# Patient Record
Sex: MALE | Race: WHITE | NOT HISPANIC OR LATINO | Employment: OTHER | URBAN - NONMETROPOLITAN AREA
[De-identification: names, ages, dates, MRNs, and addresses within clinical notes are randomized per-mention and may not be internally consistent; named-entity substitution may affect disease eponyms.]

---

## 2017-01-01 ENCOUNTER — OFFICE VISIT (OUTPATIENT)
Dept: CARDIOLOGY | Facility: CLINIC | Age: 57
End: 2017-01-01

## 2017-01-01 ENCOUNTER — TELEPHONE (OUTPATIENT)
Dept: CARDIOLOGY | Facility: CLINIC | Age: 57
End: 2017-01-01

## 2017-01-01 VITALS
OXYGEN SATURATION: 99 % | SYSTOLIC BLOOD PRESSURE: 127 MMHG | DIASTOLIC BLOOD PRESSURE: 98 MMHG | BODY MASS INDEX: 45.1 KG/M2 | HEIGHT: 70 IN | HEART RATE: 88 BPM | WEIGHT: 315 LBS

## 2017-01-01 VITALS
BODY MASS INDEX: 45.1 KG/M2 | WEIGHT: 315 LBS | OXYGEN SATURATION: 97 % | DIASTOLIC BLOOD PRESSURE: 75 MMHG | HEIGHT: 70 IN | HEART RATE: 71 BPM | SYSTOLIC BLOOD PRESSURE: 111 MMHG

## 2017-01-01 VITALS
HEIGHT: 70 IN | OXYGEN SATURATION: 97 % | HEART RATE: 70 BPM | SYSTOLIC BLOOD PRESSURE: 132 MMHG | BODY MASS INDEX: 44.78 KG/M2 | WEIGHT: 312.8 LBS | DIASTOLIC BLOOD PRESSURE: 80 MMHG

## 2017-01-01 DIAGNOSIS — I10 ESSENTIAL HYPERTENSION: Primary | ICD-10-CM

## 2017-01-01 DIAGNOSIS — I50.20 SYSTOLIC CONGESTIVE HEART FAILURE, UNSPECIFIED CONGESTIVE HEART FAILURE CHRONICITY: ICD-10-CM

## 2017-01-01 DIAGNOSIS — I25.10 CORONARY ARTERY DISEASE INVOLVING NATIVE CORONARY ARTERY OF NATIVE HEART WITHOUT ANGINA PECTORIS: ICD-10-CM

## 2017-01-01 DIAGNOSIS — I42.9 CARDIOMYOPATHY (HCC): ICD-10-CM

## 2017-01-01 DIAGNOSIS — R06.02 SHORTNESS OF BREATH: ICD-10-CM

## 2017-01-01 DIAGNOSIS — R07.9 CHEST PAIN, UNSPECIFIED TYPE: ICD-10-CM

## 2017-01-01 DIAGNOSIS — R07.2 PRECORDIAL PAIN: ICD-10-CM

## 2017-01-01 DIAGNOSIS — I25.10 CORONARY ARTERY DISEASE INVOLVING NATIVE CORONARY ARTERY OF NATIVE HEART WITHOUT ANGINA PECTORIS: Primary | ICD-10-CM

## 2017-01-01 DIAGNOSIS — I48.20 CHRONIC ATRIAL FIBRILLATION (HCC): ICD-10-CM

## 2017-01-01 DIAGNOSIS — I42.9 CARDIOMYOPATHY (HCC): Primary | ICD-10-CM

## 2017-01-01 DIAGNOSIS — E78.5 HYPERLIPEMIA: ICD-10-CM

## 2017-01-01 DIAGNOSIS — R68.89 ABNORMAL ANKLE BRACHIAL INDEX: ICD-10-CM

## 2017-01-01 DIAGNOSIS — L97.921 ULCER OF LEG, CHRONIC, LEFT, LIMITED TO BREAKDOWN OF SKIN (HCC): Primary | ICD-10-CM

## 2017-01-01 DIAGNOSIS — I73.9 PAD (PERIPHERAL ARTERY DISEASE) (HCC): ICD-10-CM

## 2017-01-01 PROCEDURE — 99214 OFFICE O/P EST MOD 30 MIN: CPT | Performed by: PHYSICIAN ASSISTANT

## 2017-01-01 PROCEDURE — 99213 OFFICE O/P EST LOW 20 MIN: CPT | Performed by: PHYSICIAN ASSISTANT

## 2017-01-01 RX ORDER — ATORVASTATIN CALCIUM 80 MG/1
TABLET, FILM COATED ORAL
Qty: 30 TABLET | Refills: 2 | Status: SHIPPED | OUTPATIENT
Start: 2017-01-01 | End: 2017-01-01 | Stop reason: SDUPTHER

## 2017-01-01 RX ORDER — TIOTROPIUM BROMIDE INHALATION SPRAY 3.12 UG/1
SPRAY, METERED RESPIRATORY (INHALATION)
COMMUNITY
Start: 2017-01-01 | End: 2018-01-01 | Stop reason: ALTCHOICE

## 2017-01-01 RX ORDER — AMIODARONE HYDROCHLORIDE 200 MG/1
TABLET ORAL DAILY
COMMUNITY
Start: 2017-01-01 | End: 2017-01-01 | Stop reason: SDUPTHER

## 2017-01-01 RX ORDER — BUMETANIDE 1 MG/1
2 TABLET ORAL 2 TIMES DAILY
COMMUNITY
Start: 2017-01-01 | End: 2017-01-01 | Stop reason: SDUPTHER

## 2017-01-01 RX ORDER — ISOSORBIDE MONONITRATE 30 MG/1
TABLET, EXTENDED RELEASE ORAL
Qty: 15 TABLET | Refills: 4 | Status: SHIPPED | OUTPATIENT
Start: 2017-01-01 | End: 2018-01-01 | Stop reason: SDUPTHER

## 2017-01-01 RX ORDER — ATORVASTATIN CALCIUM 80 MG/1
TABLET, FILM COATED ORAL
Qty: 30 TABLET | Refills: 1 | Status: SHIPPED | OUTPATIENT
Start: 2017-01-01 | End: 2018-01-01 | Stop reason: SDUPTHER

## 2017-01-01 RX ORDER — BUMETANIDE 1 MG/1
TABLET ORAL
Qty: 120 TABLET | Refills: 5 | Status: SHIPPED | OUTPATIENT
Start: 2017-01-01 | End: 2018-01-01 | Stop reason: SDUPTHER

## 2017-01-01 RX ORDER — CLONIDINE HYDROCHLORIDE 0.1 MG/1
TABLET ORAL
Qty: 90 TABLET | Refills: 3 | Status: SHIPPED | OUTPATIENT
Start: 2017-01-01

## 2017-01-01 RX ORDER — CARVEDILOL 12.5 MG/1
12.5 TABLET ORAL 2 TIMES DAILY
Qty: 60 TABLET | Refills: 11 | Status: SHIPPED | OUTPATIENT
Start: 2017-01-01 | End: 2018-01-01 | Stop reason: DRUGHIGH

## 2017-01-01 RX ORDER — AMIODARONE HYDROCHLORIDE 400 MG/1
TABLET ORAL
Qty: 30 TABLET | Refills: 2 | Status: SHIPPED | OUTPATIENT
Start: 2017-01-01 | End: 2018-01-01 | Stop reason: SDUPTHER

## 2017-01-01 RX ORDER — BUMETANIDE 1 MG/1
2 TABLET ORAL 2 TIMES DAILY
Qty: 60 TABLET | Refills: 6 | Status: SHIPPED | OUTPATIENT
Start: 2017-01-01 | End: 2017-01-01 | Stop reason: SDUPTHER

## 2017-01-01 RX ORDER — SPIRONOLACTONE 25 MG/1
25 TABLET ORAL DAILY
Qty: 30 TABLET | Refills: 11 | Status: SHIPPED | OUTPATIENT
Start: 2017-01-01 | End: 2017-01-01

## 2017-01-01 RX ORDER — SACUBITRIL AND VALSARTAN 49; 51 MG/1; MG/1
TABLET, FILM COATED ORAL
Qty: 60 TABLET | Refills: 4 | Status: SHIPPED | OUTPATIENT
Start: 2017-01-01 | End: 2018-01-01 | Stop reason: SDUPTHER

## 2017-01-04 ENCOUNTER — OFFICE VISIT (OUTPATIENT)
Dept: CARDIOLOGY | Facility: CLINIC | Age: 57
End: 2017-01-04

## 2017-01-04 VITALS
HEIGHT: 70 IN | BODY MASS INDEX: 45.1 KG/M2 | WEIGHT: 315 LBS | DIASTOLIC BLOOD PRESSURE: 87 MMHG | OXYGEN SATURATION: 98 % | HEART RATE: 69 BPM | SYSTOLIC BLOOD PRESSURE: 128 MMHG

## 2017-01-04 DIAGNOSIS — I25.10 CORONARY ARTERIOSCLEROSIS IN NATIVE ARTERY: ICD-10-CM

## 2017-01-04 DIAGNOSIS — I65.23 BILATERAL CAROTID ARTERY STENOSIS: ICD-10-CM

## 2017-01-04 DIAGNOSIS — I25.5 GENERALIZED ISCHEMIC MYOCARDIAL DYSFUNCTION: ICD-10-CM

## 2017-01-04 DIAGNOSIS — I48.20 CHRONIC ATRIAL FIBRILLATION (HCC): Primary | ICD-10-CM

## 2017-01-04 DIAGNOSIS — I10 ESSENTIAL HYPERTENSION: ICD-10-CM

## 2017-01-04 DIAGNOSIS — R60.9 PERIPHERAL EDEMA: ICD-10-CM

## 2017-01-04 DIAGNOSIS — E78.5 DYSLIPIDEMIA: ICD-10-CM

## 2017-01-04 PROCEDURE — 99213 OFFICE O/P EST LOW 20 MIN: CPT | Performed by: NURSE PRACTITIONER

## 2017-01-04 RX ORDER — AMIODARONE HYDROCHLORIDE 400 MG/1
400 TABLET ORAL DAILY
Qty: 90 TABLET | Refills: 3 | Status: SHIPPED | OUTPATIENT
Start: 2017-01-04 | End: 2017-01-01 | Stop reason: SDUPTHER

## 2017-01-04 NOTE — PATIENT INSTRUCTIONS
"You Can Quit Smoking  If you are ready to quit smoking or are thinking about it, congratulations! You have chosen to help yourself be healthier and live longer! There are lots of different ways to quit smoking. Nicotine gum, nicotine patches, a nicotine inhaler, or nicotine nasal spray can help with physical craving. Hypnosis, support groups, and medicines help break the habit of smoking.  TIPS TO GET OFF AND STAY OFF CIGARETTES  · Learn to predict your moods. Do not let a bad situation be your excuse to have a cigarette. Some situations in your life might tempt you to have a cigarette.  · Ask friends and co-workers not to smoke around you.  · Make your home smoke-free.  · Never have \"just one\" cigarette. It leads to wanting another and another. Remind yourself of your decision to quit.  · On a card, make a list of your reasons for not smoking. Read it at least the same number of times a day as you have a cigarette. Tell yourself everyday, \"I do not want to smoke. I choose not to smoke.\"  · Ask someone at home or work to help you with your plan to quit smoking.  · Have something planned after you eat or have a cup of coffee. Take a walk or get other exercise to perk you up. This will help to keep you from overeating.  · Try a relaxation exercise to calm you down and decrease your stress. Remember, you may be tense and nervous the first two weeks after you quit. This will pass.  · Find new activities to keep your hands busy. Play with a pen, coin, or rubber band. Doodle or draw things on paper.  · Brush your teeth right after eating. This will help cut down the craving for the taste of tobacco after meals. You can try mouthwash too.  · Try gum, breath mints, or diet candy to keep something in your mouth.  IF YOU SMOKE AND WANT TO QUIT:  · Do not stock up on cigarettes. Never buy a carton. Wait until one pack is finished before you buy another.  · Never carry cigarettes with you at work or at home.  · Keep cigarettes " "as far away from you as possible. Leave them with someone else.  · Never carry matches or a lighter with you.  · Ask yourself, \"Do I need this cigarette or is this just a reflex?\"  · Bet with someone that you can quit. Put cigarette money in a [employer] bank every morning. If you smoke, you give up the money. If you do not smoke, by the end of the week, you keep the money.  · Keep trying. It takes 21 days to change a habit!  · Talk to your doctor about using medicines to help you quit. These include nicotine replacement gum, lozenges, or skin patches.     This information is not intended to replace advice given to you by your health care provider. Make sure you discuss any questions you have with your health care provider.     Document Released: 10/14/2010 Document Revised: 03/11/2013 Document Reviewed: 10/14/2010  [employer] Interactive Patient Education ©2016 [employer]  Cardiomyopathy  Cardiomyopathy is a long-term (chronic) disease of the heart muscle (myocardium). Over time, the heart becomes abnormally large, thick, or stiff. This makes it harder for the heart to pump blood and can lead to heart failure.  There are several types of cardiomyopathy:  · Dilated cardiomyopathy. This type causes the ventricles become large and weak.  · Hypertrophic cardiomyopathy. This type causes the heart muscle to thicken.  · Restrictive cardiomyopathy. This type causes the heart muscle to become rigid and less elastic.  · Ischemic cardiomyopathy. This type involves narrowing arteries that cause the walls of the heart get thinner.  · Peripartum cardiomyopathy. This type occurs during pregnancy or shortly after pregnancy.  CAUSES  The cause of cardiomyopathy is often not known. In some cases, it is passed down (inherited) from a family member who also had cardiomyopathy. The disease may develop as a complication of another medical condition. These conditions can include:  · Diabetes.  · High blood pressure.  · Viral infection of the " heart.  · Heart attack.  · Coronary heart disease.  RISK FACTORS  You may be more likely to develop cardiomyopathy if you:  · Have a family history of cardiomyopathy or other heart problems.  · Are overweight or obese.  · Use illegal drugs.  · Abuse alcohol.  · Have diabetes.  · Have another disease that can cause cardiomyopathy as a complication.  SIGNS AND SYMPTOMS  Often, cardiomyopathy has no signs or symptoms. If you do have symptoms, they may include:  · Shortness of breath, especially during activity.  · Fatigue.  · An irregular heartbeat (arrhythmia).  · Dizziness, light-headedness, or fainting.  · Chest pain.  · Swelling in the lower leg or ankle.  DIAGNOSIS  Your health care provider may suspect cardiomyopathy based on your symptoms and medical history. Your health care provider will also do a physical exam. Other tests done may include:  · Blood tests.  · Imaging studies of your heart. These may be done using:    X-rays to check if your heart is enlarged.    Echocardiogram to show the size of your heart and how well it pumps.    MRI.  · A test to record the electrical activity of your heart (electrocardiogram or ECG).  · A test in which you wear a portable device (event monitor) to record your heart's electrical activity while you go about your day.  · A test to monitor your heart's activity while you exercise (stress test).    · A procedure to check the blood pressure and blood flow in your heart (cardiac catheterization).  · Injection of dye into your arteries before imaging studies are taken (angiogram).  · Removal of a sample of heart tissue (biopsy). The sample is examined for problems.  TREATMENT  Treatment depends on the type of cardiomyopathy you have and the severity of your symptoms. If you are not having any symptoms, you might not need treatment. If you need treatment, it may include:  · Lifestyle changes.    Quit smoking, if you smoke.    Maintain a healthy weight. Lose weight if directed by  your health care provider.    Eat a healthy diet. Include plenty of fruits, vegetables, and whole grains.    Get regular exercise. Ask your health care provider to suggest some activities that are good for you.  · Medicine. You may need to take medicine to:    Lower your blood pressure.    Slow down your heart rate.    Keep your heart beating in a steady rhythm.    Clear excess fluids from your body.    Prevent blood clots.  · Surgery. You may need surgery to:    Repair a defect.    Remove thickened tissue.    Implant a device to treat serious heart rhythm problems (implantable cardioverter-defibrillator or ICD).    Replace your heart (heart transplant) if all other treatments have failed (end stage).  HOME CARE INSTRUCTIONS  · Take medicines only as directed by your health care provider.  · Eat a heart-healthy diet. Work with your health care provider or a registered dietitian to learn about healthy eating options.  · Maintain a healthy weight and stay physically active.  · Do not use any tobacco products, including cigarettes, chewing tobacco, or electronic cigarettes. If you need help quitting, ask your health care provider.  · Work closely with your health care provider to manage chronic conditions, such as diabetes and high blood pressure.  · Limit alcohol intake to no more than one drink per day for nonpregnant women and no more than two drinks per day for men. One drink equals 12 ounces of beer, 5 ounces of wine, or 1½ ounces of hard liquor.  · Try to get at least 7 hours of sleep each night.  · Find ways to manage stress.  · Keep all follow-up visits as directed by your health care provider. This is important.  SEEK MEDICAL CARE IF:  · Your symptoms get worse, even after treatment.  · You have new symptoms.  SEEK IMMEDIATE MEDICAL CARE IF:  · You have severe chest pain.  · You have shortness of breath.  · You cough up pink, bubbly material.  · You have sudden sweating.  · You feel nauseous and vomit.  · You  suddenly become light-headed or dizzy.  · You feel your heart beating very fast.  · It feels like your heart is skipping beats.  These symptoms may represent a serious problem that is an emergency. Do not wait to see if the symptoms will go away. Get medical help right away. Call your local emergency services (911 in the [place] Do not drive yourself to the hospital.     This information is not intended to replace advice given to you by your health care provider. Make sure you discuss any questions you have with your health care provider.     Document Released: 03/02/2006 Document Revised: 01/08/2016 Document Reviewed: 05/28/2015  [employer] Interactive Patient Education ©2016 [employer]  Peripheral Edema  You have swelling in your legs (peripheral edema). This swelling is due to excess accumulation of salt and water in your body. Edema may be a sign of heart, kidney or liver disease, or a side effect of a medication. It may also be due to problems in the leg veins. Elevating your legs and using special support stockings may be very helpful, if the cause of the swelling is due to poor venous circulation. Avoid long periods of standing, whatever the cause.  Treatment of edema depends on identifying the cause. Chips, pretzels, pickles and other salty foods should be avoided. Restricting salt in your diet is almost always needed. Water pills (diuretics) are often used to remove the excess salt and water from your body via urine. These medicines prevent the kidney from reabsorbing sodium. This increases urine flow.  Diuretic treatment may also result in lowering of potassium levels in your body. Potassium supplements may be needed if you have to use diuretics daily. Daily weights can help you keep track of your progress in clearing your edema. You should call your caregiver for follow up care as recommended.  SEEK IMMEDIATE MEDICAL CARE IF:   · You have increased swelling, pain, redness, or heat in your legs.  · You  develop shortness of breath, especially when lying down.  · You develop chest or abdominal pain, weakness, or fainting.  · You have a fever.     This information is not intended to replace advice given to you by your health care provider. Make sure you discuss any questions you have with your health care provider.     Document Released: 01/25/2006 Document Revised: 03/11/2013 Document Reviewed: 06/29/2016  [employer] Interactive Patient Education ©2016 [employer]

## 2017-01-04 NOTE — PROGRESS NOTES
Subjective   Florencio Powell is a 56 y.o. male     Chief Complaint   Patient presents with   • Follow-up     6 WEEK F/U       HPI    1. Coronary artery disease  1.1 Catheterization, January 2014 indicated chronic total occlusion of the RCA with diffuse disease in the LAD and circumflex, no target for percutaneous intervention.  1.2 Repeat cath, January 2015 with stenting of the LAD and subtotal occlusion of the right with collateral flows.  1.3 Stress Test 6/16/16 - study corroborates the patient's known CAD; EF 23%  2. Dilated cardiomyopathy  2.1 Echocardiogram during hospitalization, October 2014 indicated an EF of approximately 20-25%.  2.2 EF of 35-40% by echocardiogram in April 2015.  2.3 MUGA scan, June 2015 with EF of 36%.  2.4 Echo 6/13/16 - EF 15-20%; mild LA enlargement  2.5 AICD (single chamber) placement 8/12/16 with Dr. [facility] [employer]  2.6 Echo 8/22/16 - mod LVH; severe global hypokinesis; EF 20-25%; mild MR; trivial to mild TR; prox aortic root upper limits of normal 3.6; PA pressure in the 20s.   3. Conduction system disease status post pacemaker implantation by Dr. Bravo, October 2014.   4. Chronic Atrial fibrillation on Xarelto  5. History of CVA secondary to carotid artery stenosis, followed by Dr. Rader.   5.1 Embolic stroke of the right dewayne  6. Congestive heart failure during recent hospitalization, October 2014.  7. Hypertension  8. Obstructive sleep apnea, noncompliant with CPAP therapy.      9. Smoking Habituation     10. COPD       11. PVD       11.1 SU 10/17/16 - occlusion of the anterior tibial     Patient is a 56-year-old male with sig history of CAD, PVD and stroke who is here for a follow-up with his daughter at his side. He denies any chest pain, pressure, palpitations, fluttering, dizziness, presyncope, syncope, orthopnea or PND. He says he still has BLE edema, however, he is going to wound care and his daughter says he is finally elevating his legs and they are looking much  better per both of them.  They are wrapped today, right from mid thigh to calve and left from mid thigh to ankle.  He has an appt with Dr. Bravo on 1/11/17.  His CTA Abd with run off is same day so his daughter is going to skip that and see Dr. Bravo and see what he orders as he may not require it.  Patient is short of breath with any activity that he does.  We did receive note from Dr. Friend indicating it was ok for patient to have CTA based on renal function.  PCP monitors his cholesterol. He still smokes 1 PPD.  He denies any signs of bleeding or cerebral ischemic events.     Current Outpatient Prescriptions   Medication Sig Dispense Refill   • amitriptyline (ELAVIL) 100 MG tablet Daily.     • aspirin 81 MG tablet Take 1 tablet by mouth daily.     • atorvastatin (LIPITOR) 80 MG tablet Take 1 tablet by mouth every evening. 90 tablet 3   • carvedilol (COREG) 12.5 MG tablet Take 1 tablet by mouth 2 (two) times a day. 180 tablet 3   • cloNIDine (CATAPRES) 0.1 MG tablet Take 1 tablet by mouth 3 (Three) Times a Day As Needed for high blood pressure (SBP >160 or DBP > 90). 30 tablet 3   • furosemide (LASIX) 40 MG tablet Take 1 tablet by mouth Every Other Day. (Patient taking differently: Take 40 mg by mouth Daily.) 30 tablet 4   • HYDROcodone-acetaminophen (NORCO) 7.5-325 MG per tablet PRN     • isosorbide mononitrate (IMDUR) 30 MG 24 hr tablet TAKE 1/2 TABLET DAILY. 15 tablet 5   • levothyroxine (SYNTHROID, LEVOTHROID) 75 MCG tablet Take 1 tablet by mouth daily.     • lisinopril (PRINIVIL,ZESTRIL) 20 MG tablet Take 1 tablet by mouth Daily. 30 tablet 5   • PACERONE 400 MG tablet Take 1 tablet by mouth Daily. 90 tablet 3   • rivaroxaban (XARELTO) 15 MG tablet Take 1 tablet by mouth Daily. 30 tablet 5   • metOLazone (ZAROXOLYN) 5 MG tablet TAKE 1 TABLET 30 MIN BEFORE LASIX FOR 3 DAYS 6 tablet 2   • NAPROXEN  MG EC tablet daily.     • nicotine polacrilex (NICORETTE) 4 MG gum        No current facility-administered  medications for this visit.        ALLERGIES    Review of patient's allergies indicates no known allergies.    Past Medical History   Diagnosis Date   • Acute myocardial infarction    • Atrial fibrillation    • CAD (coronary artery disease), native coronary artery    • Cardiac conduction disorder    • Cardiomyopathy      Dilated, Ischemic   • Congestive heart failure    • Dizziness    • Ejection fraction < 50%    • Gout    • Hematoma    • Hematuria    • Hemoptysis    • Hyperlipidemia    • Hypertension    • Hypothyroidism    • Postprocedural pain of extremity following cardiac catheterization    • Shortness of breath    • Stroke        Social History     Social History   • Marital status:      Spouse name: N/A   • Number of children: N/A   • Years of education: N/A     Occupational History   • Not on file.     Social History Main Topics   • Smoking status: Current Every Day Smoker     Packs/day: 0.25     Types: Cigarettes   • Smokeless tobacco: Never Used   • Alcohol use No   • Drug use: No   • Sexual activity: Not on file     Other Topics Concern   • Not on file     Social History Narrative       Family History   Problem Relation Age of Onset   • Cancer Mother    • Hypertension Mother    • Cancer Father    • Hypertension Brother        Review of Systems   Constitutional: Negative for diaphoresis and fatigue.   HENT: Positive for rhinorrhea and sneezing.    Eyes: Positive for visual disturbance (reading glasses).   Respiratory: Positive for shortness of breath (all of the time, no change). Negative for chest tightness.    Cardiovascular: Positive for leg swelling (little better since going to wound care clinic ). Negative for chest pain and palpitations.   Gastrointestinal: Negative for nausea and vomiting.   Endocrine: Negative.    Musculoskeletal: Positive for gait problem (ambulates with cane). Negative for arthralgias, back pain and neck pain.   Skin: Negative.    Allergic/Immunologic: Positive for  "environmental allergies.   Neurological: Negative for dizziness, syncope and light-headedness.   Hematological: Bruises/bleeds easily.   Psychiatric/Behavioral: Positive for sleep disturbance (off and on).       Objective   Visit Vitals   • /87 (BP Location: Right arm, Patient Position: Sitting)   • Pulse 69   • Ht 70\" (177.8 cm)   • Wt (!) 343 lb 9.6 oz (156 kg)   • SpO2 98%   • BMI 49.3 kg/m2     Lab Results (most recent)     None        Physical Exam   Constitutional: He is oriented to person, place, and time. Vital signs are normal. He appears well-developed and well-nourished. He is active and cooperative.   HENT:   Head: Normocephalic.   Mouth/Throat: Abnormal dentition (missing teeth ).   Eyes: Lids are normal.   Neck: Normal carotid pulses, no hepatojugular reflux and no JVD present. Carotid bruit is present (left ).   Cardiovascular: Normal rate, regular rhythm and normal heart sounds.    Pulses:       Radial pulses are 2+ on the right side, and 2+ on the left side.   1+ RLE edema; LLE wrapped unable to assess;  Diminished pedal pulses BLE.    Pulmonary/Chest: Effort normal. He has wheezes in the right lower field and the left lower field.   Abdominal: Normal appearance.   Musculoskeletal:   Uses a cane   Neurological: He is alert and oriented to person, place, and time.   Skin: Skin is warm and dry. Laceration (arms from dog) noted.   PVD changes; AICD CHELSI    Psychiatric: He has a normal mood and affect. His speech is normal and behavior is normal. Judgment and thought content normal. Cognition and memory are normal.         Assessment/Plan      Diagnosis Plan   1. Chronic atrial fibrillation  PACERONE 400 MG tablet   2. Essential hypertension     3. Generalized ischemic myocardial dysfunction     4. Coronary arteriosclerosis in native artery     5. Bilateral carotid artery stenosis     6. Dyslipidemia     7. Peripheral edema         Return needs AICD Check and Appt for Feb.       Patient stable from " a cardiac standpoint.  He will continue his medication regimen.  He was still taking Pacerone 400 BID, which should have only been for the 1st month.  He needs a AICD check in Feb and I will see him on that day to see what Dr. Bravo has determined.

## 2017-01-04 NOTE — MR AVS SNAPSHOT
Florencio S Sherry   1/4/2017 2:15 PM   Office Visit    Dept Phone:  [phone]   Encounter #:  [id number]    Provider:  VICKIE Cruz   Department:  [facility] CARDIOLOGY                Your Full Care Plan              Today's Medication Changes          These changes are accurate as of: 1/4/17  3:31 PM.  If you have any questions, ask your nurse or doctor.               Medication(s)that have changed:     PACERONE 400 MG tablet   Generic drug:  amiodarone   Take 1 tablet by mouth Daily.   What changed:    - when to take this  - additional instructions   Changed by:  VICKIE Cruz            Where to Get Your Medications      These medications were sent to [address] - [phone] [facility] [phone] [address] [place] [address]     Phone:  [phone]     PACERONE 400 MG tablet                  Your Updated Medication List          This list is accurate as of: 1/4/17  3:31 PM.  Always use your most recent med list.                amitriptyline 100 MG tablet   Commonly known as:  ELAVIL       aspirin 81 MG tablet       atorvastatin 80 MG tablet   Commonly known as:  LIPITOR   Take 1 tablet by mouth every evening.       carvedilol 12.5 MG tablet   Commonly known as:  COREG   Take 1 tablet by mouth 2 (two) times a day.       CloNIDine 0.1 MG tablet   Commonly known as:  CATAPRES   Take 1 tablet by mouth 3 (Three) Times a Day As Needed for high blood pressure (SBP >160 or DBP > 90).       furosemide 40 MG tablet   Commonly known as:  LASIX   Take 1 tablet by mouth Every Other Day.       HYDROcodone-acetaminophen 7.5-325 MG per tablet   Commonly known as:  NORCO       isosorbide mononitrate 30 MG 24 hr tablet   Commonly known as:  IMDUR   TAKE 1/2 TABLET DAILY.       levothyroxine 75 MCG tablet   Commonly known as:  SYNTHROID, LEVOTHROID       lisinopril 20 MG tablet   Commonly known as:  PRINIVIL,ZESTRIL    "  Take 1 tablet by mouth Daily.       metOLazone 5 MG tablet   Commonly known as:  ZAROXOLYN   TAKE 1 TABLET 30 MIN BEFORE LASIX FOR 3 DAYS       NAPROXEN  MG EC tablet   Generic drug:  naproxen       nicotine polacrilex 4 MG gum   Commonly known as:  NICORETTE       PACERONE 400 MG tablet   Generic drug:  amiodarone   Take 1 tablet by mouth Daily.       rivaroxaban 15 MG tablet   Commonly known as:  XARELTO   Take 1 tablet by mouth Daily.               You Were Diagnosed With        Codes Comments    Chronic atrial fibrillation    -  Primary ICD-10-CM: I48.2  ICD-9-CM: 427.31     Essential hypertension     ICD-10-CM: I10  ICD-9-CM: 401.9     Generalized ischemic myocardial dysfunction     ICD-10-CM: I25.5  ICD-9-CM: 414.8     Coronary arteriosclerosis in native artery     ICD-10-CM: I25.10  ICD-9-CM: 414.01     Bilateral carotid artery stenosis     ICD-10-CM: I65.23  ICD-9-CM: 433.10, 433.30     Dyslipidemia     ICD-10-CM: E78.5  ICD-9-CM: 272.4     Peripheral edema     ICD-10-CM: R60.9  ICD-9-CM: 782.3       Instructions    You Can Quit Smoking  If you are ready to quit smoking or are thinking about it, congratulations! You have chosen to help yourself be healthier and live longer! There are lots of different ways to quit smoking. Nicotine gum, nicotine patches, a nicotine inhaler, or nicotine nasal spray can help with physical craving. Hypnosis, support groups, and medicines help break the habit of smoking.  TIPS TO GET OFF AND STAY OFF CIGARETTES  · Learn to predict your moods. Do not let a bad situation be your excuse to have a cigarette. Some situations in your life might tempt you to have a cigarette.  · Ask friends and co-workers not to smoke around you.  · Make your home smoke-free.  · Never have \"just one\" cigarette. It leads to wanting another and another. Remind yourself of your decision to quit.  · On a card, make a list of your reasons for not smoking. Read it at least the same number of times a " "day as you have a cigarette. Tell yourself everyday, \"I do not want to smoke. I choose not to smoke.\"  · Ask someone at home or work to help you with your plan to quit smoking.  · Have something planned after you eat or have a cup of coffee. Take a walk or get other exercise to perk you up. This will help to keep you from overeating.  · Try a relaxation exercise to calm you down and decrease your stress. Remember, you may be tense and nervous the first two weeks after you quit. This will pass.  · Find new activities to keep your hands busy. Play with a pen, coin, or rubber band. Doodle or draw things on paper.  · Brush your teeth right after eating. This will help cut down the craving for the taste of tobacco after meals. You can try mouthwash too.  · Try gum, breath mints, or diet candy to keep something in your mouth.  IF YOU SMOKE AND WANT TO QUIT:  · Do not stock up on cigarettes. Never buy a carton. Wait until one pack is finished before you buy another.  · Never carry cigarettes with you at work or at home.  · Keep cigarettes as far away from you as possible. Leave them with someone else.  · Never carry matches or a lighter with you.  · Ask yourself, \"Do I need this cigarette or is this just a reflex?\"  · Bet with someone that you can quit. Put cigarette money in a [employer] bank every morning. If you smoke, you give up the money. If you do not smoke, by the end of the week, you keep the money.  · Keep trying. It takes 21 days to change a habit!  · Talk to your doctor about using medicines to help you quit. These include nicotine replacement gum, lozenges, or skin patches.     This information is not intended to replace advice given to you by your health care provider. Make sure you discuss any questions you have with your health care provider.     Document Released: 10/14/2010 Document Revised: 03/11/2013 Document Reviewed: 10/14/2010  Elsevier Interactive Patient Education ©2016 Elsevier " Inc.  Cardiomyopathy  Cardiomyopathy is a long-term (chronic) disease of the heart muscle (myocardium). Over time, the heart becomes abnormally large, thick, or stiff. This makes it harder for the heart to pump blood and can lead to heart failure.  There are several types of cardiomyopathy:  · Dilated cardiomyopathy. This type causes the ventricles become large and weak.  · Hypertrophic cardiomyopathy. This type causes the heart muscle to thicken.  · Restrictive cardiomyopathy. This type causes the heart muscle to become rigid and less elastic.  · Ischemic cardiomyopathy. This type involves narrowing arteries that cause the walls of the heart get thinner.  · Peripartum cardiomyopathy. This type occurs during pregnancy or shortly after pregnancy.  CAUSES  The cause of cardiomyopathy is often not known. In some cases, it is passed down (inherited) from a family member who also had cardiomyopathy. The disease may develop as a complication of another medical condition. These conditions can include:  · Diabetes.  · High blood pressure.  · Viral infection of the heart.  · Heart attack.  · Coronary heart disease.  RISK FACTORS  You may be more likely to develop cardiomyopathy if you:  · Have a family history of cardiomyopathy or other heart problems.  · Are overweight or obese.  · Use illegal drugs.  · Abuse alcohol.  · Have diabetes.  · Have another disease that can cause cardiomyopathy as a complication.  SIGNS AND SYMPTOMS  Often, cardiomyopathy has no signs or symptoms. If you do have symptoms, they may include:  · Shortness of breath, especially during activity.  · Fatigue.  · An irregular heartbeat (arrhythmia).  · Dizziness, light-headedness, or fainting.  · Chest pain.  · Swelling in the lower leg or ankle.  DIAGNOSIS  Your health care provider may suspect cardiomyopathy based on your symptoms and medical history. Your health care provider will also do a physical exam. Other tests done may include:  · Blood  tests.  · Imaging studies of your heart. These may be done using:    X-rays to check if your heart is enlarged.    Echocardiogram to show the size of your heart and how well it pumps.    MRI.  · A test to record the electrical activity of your heart (electrocardiogram or ECG).  · A test in which you wear a portable device (event monitor) to record your heart's electrical activity while you go about your day.  · A test to monitor your heart's activity while you exercise (stress test).    · A procedure to check the blood pressure and blood flow in your heart (cardiac catheterization).  · Injection of dye into your arteries before imaging studies are taken (angiogram).  · Removal of a sample of heart tissue (biopsy). The sample is examined for problems.  TREATMENT  Treatment depends on the type of cardiomyopathy you have and the severity of your symptoms. If you are not having any symptoms, you might not need treatment. If you need treatment, it may include:  · Lifestyle changes.    Quit smoking, if you smoke.    Maintain a healthy weight. Lose weight if directed by your health care provider.    Eat a healthy diet. Include plenty of fruits, vegetables, and whole grains.    Get regular exercise. Ask your health care provider to suggest some activities that are good for you.  · Medicine. You may need to take medicine to:    Lower your blood pressure.    Slow down your heart rate.    Keep your heart beating in a steady rhythm.    Clear excess fluids from your body.    Prevent blood clots.  · Surgery. You may need surgery to:    Repair a defect.    Remove thickened tissue.    Implant a device to treat serious heart rhythm problems (implantable cardioverter-defibrillator or ICD).    Replace your heart (heart transplant) if all other treatments have failed (end stage).  HOME CARE INSTRUCTIONS  · Take medicines only as directed by your health care provider.  · Eat a heart-healthy diet. Work with your health care provider or a  registered dietitian to learn about healthy eating options.  · Maintain a healthy weight and stay physically active.  · Do not use any tobacco products, including cigarettes, chewing tobacco, or electronic cigarettes. If you need help quitting, ask your health care provider.  · Work closely with your health care provider to manage chronic conditions, such as diabetes and high blood pressure.  · Limit alcohol intake to no more than one drink per day for nonpregnant women and no more than two drinks per day for men. One drink equals 12 ounces of beer, 5 ounces of wine, or 1½ ounces of hard liquor.  · Try to get at least 7 hours of sleep each night.  · Find ways to manage stress.  · Keep all follow-up visits as directed by your health care provider. This is important.  SEEK MEDICAL CARE IF:  · Your symptoms get worse, even after treatment.  · You have new symptoms.  SEEK IMMEDIATE MEDICAL CARE IF:  · You have severe chest pain.  · You have shortness of breath.  · You cough up pink, bubbly material.  · You have sudden sweating.  · You feel nauseous and vomit.  · You suddenly become light-headed or dizzy.  · You feel your heart beating very fast.  · It feels like your heart is skipping beats.  These symptoms may represent a serious problem that is an emergency. Do not wait to see if the symptoms will go away. Get medical help right away. Call your local emergency services (911 in the [place] Do not drive yourself to the hospital.     This information is not intended to replace advice given to you by your health care provider. Make sure you discuss any questions you have with your health care provider.     Document Released: 03/02/2006 Document Revised: 01/08/2016 Document Reviewed: 05/28/2015  [employer] Interactive Patient Education ©2016 [employer] Inc.  Peripheral Edema  You have swelling in your legs (peripheral edema). This swelling is due to excess accumulation of salt and water in your body. Edema may be a sign of  heart, kidney or liver disease, or a side effect of a medication. It may also be due to problems in the leg veins. Elevating your legs and using special support stockings may be very helpful, if the cause of the swelling is due to poor venous circulation. Avoid long periods of standing, whatever the cause.  Treatment of edema depends on identifying the cause. Chips, pretzels, pickles and other salty foods should be avoided. Restricting salt in your diet is almost always needed. Water pills (diuretics) are often used to remove the excess salt and water from your body via urine. These medicines prevent the kidney from reabsorbing sodium. This increases urine flow.  Diuretic treatment may also result in lowering of potassium levels in your body. Potassium supplements may be needed if you have to use diuretics daily. Daily weights can help you keep track of your progress in clearing your edema. You should call your caregiver for follow up care as recommended.  SEEK IMMEDIATE MEDICAL CARE IF:   · You have increased swelling, pain, redness, or heat in your legs.  · You develop shortness of breath, especially when lying down.  · You develop chest or abdominal pain, weakness, or fainting.  · You have a fever.     This information is not intended to replace advice given to you by your health care provider. Make sure you discuss any questions you have with your health care provider.     Document Released: 01/25/2006 Document Revised: 03/11/2013 Document Reviewed: 06/29/2016  [employer] Interactive Patient Education ©2016 [employer].       Patient Instructions History      Upcoming Appointments     Visit Type Date Time Department    OFFICE VISIT 1/4/2017  2:15 PM [facility]    [facility] 5/26/2017 11:15 AM [facility] CHARLES Short Signup     [facility] allows you to send messages to your doctor, view your test results, renew your prescriptions, schedule appointments, and more. To sign up,  "go to [employer] and click on the Sign Up Now link in the New User? box. Enter your [employer] Activation Code exactly as it appears below along with the last four digits of your Social Security Number and your Date of Birth () to complete the sign-up process. If you do not sign up before the expiration date, you must request a new code.    [employer] Activation Code: [email or web address]  Expires: 2017  3:31 PM    If you have questions, you can email [email or web address] or call [phone] to talk to our [employer] staff. Remember, [employer] is NOT to be used for urgent needs. For medical emergencies, dial 911.               Other Info from Your Visit           Your Appointments     May 26, 2017 11:15 AM EDT   PACER with PACEMAKER CARD [facility] CARDIOLOGY (--)    [address] [place] [id number] [phone]              Allergies     No Known Allergies      Reason for Visit     Follow-up 6 WEEK F/U      Vital Signs     Blood Pressure Pulse Height Weight Oxygen Saturation Body Mass Index    128/87 (BP Location: Right arm, Patient Position: Sitting) 69 70\" (177.8 cm) 343 lb 9.6 oz (156 kg) 98% 49.3 kg/m2    Smoking Status                   Current Every Day Smoker           Problems and Diagnoses Noted     Atrial fibrillation (irregular heartbeat)    Narrowing of artery in neck    Heart disease due to blocked artery    Dyslipidemia    Generalized ischemic myocardial dysfunction    High blood pressure    Peripheral edema            "

## 2017-03-02 ENCOUNTER — OFFICE VISIT (OUTPATIENT)
Dept: CARDIOLOGY | Facility: CLINIC | Age: 57
End: 2017-03-02

## 2017-03-02 VITALS
DIASTOLIC BLOOD PRESSURE: 109 MMHG | BODY MASS INDEX: 45.1 KG/M2 | OXYGEN SATURATION: 98 % | HEART RATE: 89 BPM | WEIGHT: 315 LBS | HEIGHT: 70 IN | SYSTOLIC BLOOD PRESSURE: 148 MMHG

## 2017-03-02 DIAGNOSIS — I25.10 CORONARY ARTERIOSCLEROSIS IN NATIVE ARTERY: Primary | ICD-10-CM

## 2017-03-02 DIAGNOSIS — I10 ESSENTIAL HYPERTENSION: ICD-10-CM

## 2017-03-02 DIAGNOSIS — Z00.00 HEALTHCARE MAINTENANCE: ICD-10-CM

## 2017-03-02 DIAGNOSIS — G47.33 OBSTRUCTIVE SLEEP APNEA: ICD-10-CM

## 2017-03-02 DIAGNOSIS — I42.0 CONGESTIVE CARDIOMYOPATHY (HCC): ICD-10-CM

## 2017-03-02 DIAGNOSIS — I48.91 ATRIAL FIBRILLATION, UNSPECIFIED TYPE (HCC): ICD-10-CM

## 2017-03-02 DIAGNOSIS — R06.02 SHORTNESS OF BREATH: ICD-10-CM

## 2017-03-02 DIAGNOSIS — E78.5 DYSLIPIDEMIA: ICD-10-CM

## 2017-03-02 DIAGNOSIS — R60.9 PERIPHERAL EDEMA: ICD-10-CM

## 2017-03-02 DIAGNOSIS — I65.23 BILATERAL CAROTID ARTERY STENOSIS: ICD-10-CM

## 2017-03-02 PROCEDURE — 93289 INTERROG DEVICE EVAL HEART: CPT | Performed by: NURSE PRACTITIONER

## 2017-03-02 PROCEDURE — 99213 OFFICE O/P EST LOW 20 MIN: CPT | Performed by: NURSE PRACTITIONER

## 2017-03-02 RX ORDER — FUROSEMIDE 40 MG/1
80 TABLET ORAL DAILY
Qty: 30 TABLET | Refills: 4
Start: 2017-03-02 | End: 2017-03-23 | Stop reason: SDUPTHER

## 2017-03-02 RX ORDER — CARVEDILOL 6.25 MG/1
6.25 TABLET ORAL 2 TIMES DAILY
Qty: 60 TABLET | Refills: 5 | Status: SHIPPED | OUTPATIENT
Start: 2017-03-02 | End: 2017-01-01

## 2017-03-02 RX ORDER — LOSARTAN POTASSIUM 25 MG/1
TABLET ORAL
COMMUNITY
Start: 2017-02-27 | End: 2017-03-23 | Stop reason: SDUPTHER

## 2017-03-02 RX ORDER — CARVEDILOL 3.12 MG/1
TABLET ORAL 2 TIMES DAILY
COMMUNITY
Start: 2017-02-27 | End: 2017-03-02 | Stop reason: SDUPTHER

## 2017-03-02 NOTE — PATIENT INSTRUCTIONS
"You Can Quit Smoking  If you are ready to quit smoking or are thinking about it, congratulations! You have chosen to help yourself be healthier and live longer! There are lots of different ways to quit smoking. Nicotine gum, nicotine patches, a nicotine inhaler, or nicotine nasal spray can help with physical craving. Hypnosis, support groups, and medicines help break the habit of smoking.  TIPS TO GET OFF AND STAY OFF CIGARETTES  · Learn to predict your moods. Do not let a bad situation be your excuse to have a cigarette. Some situations in your life might tempt you to have a cigarette.  · Ask friends and co-workers not to smoke around you.  · Make your home smoke-free.  · Never have \"just one\" cigarette. It leads to wanting another and another. Remind yourself of your decision to quit.  · On a card, make a list of your reasons for not smoking. Read it at least the same number of times a day as you have a cigarette. Tell yourself everyday, \"I do not want to smoke. I choose not to smoke.\"  · Ask someone at home or work to help you with your plan to quit smoking.  · Have something planned after you eat or have a cup of coffee. Take a walk or get other exercise to perk you up. This will help to keep you from overeating.  · Try a relaxation exercise to calm you down and decrease your stress. Remember, you may be tense and nervous the first two weeks after you quit. This will pass.  · Find new activities to keep your hands busy. Play with a pen, coin, or rubber band. Doodle or draw things on paper.  · Brush your teeth right after eating. This will help cut down the craving for the taste of tobacco after meals. You can try mouthwash too.  · Try gum, breath mints, or diet candy to keep something in your mouth.  IF YOU SMOKE AND WANT TO QUIT:  · Do not stock up on cigarettes. Never buy a carton. Wait until one pack is finished before you buy another.  · Never carry cigarettes with you at work or at home.  · Keep cigarettes " "as far away from you as possible. Leave them with someone else.  · Never carry matches or a lighter with you.  · Ask yourself, \"Do I need this cigarette or is this just a reflex?\"  · Bet with someone that you can quit. Put cigarette money in a [employer] bank every morning. If you smoke, you give up the money. If you do not smoke, by the end of the week, you keep the money.  · Keep trying. It takes 21 days to change a habit!  · Talk to your doctor about using medicines to help you quit. These include nicotine replacement gum, lozenges, or skin patches.     This information is not intended to replace advice given to you by your health care provider. Make sure you discuss any questions you have with your health care provider.     Document Released: 10/14/2010 Document Revised: 03/11/2013 Document Reviewed: 05/03/2016  [employer] Interactive Patient Education ©2016 [employer]  Heart Failure  Heart failure means your heart has trouble pumping blood. This makes it hard for your body to work well. Heart failure is usually a long-term (chronic) condition. You must take good care of yourself and follow your doctor's treatment plan.  HOME CARE  · Take your heart medicine as told by your doctor.    Do not stop taking medicine unless your doctor tells you to.    Do not skip any dose of medicine.    Refill your medicines before they run out.    Take other medicines only as told by your doctor or pharmacist.  · Stay active if told by your doctor. The elderly and people with severe heart failure should talk with a doctor about physical activity.  · Eat heart-healthy foods. Choose foods that are without trans fat and are low in saturated fat, cholesterol, and salt (sodium). This includes fresh or frozen fruits and vegetables, fish, lean meats, fat-free or low-fat dairy foods, whole grains, and high-fiber foods. Lentils and dried peas and beans (legumes) are also good choices.  · Limit salt if told by your doctor.  · Cook in a healthy " way. Roast, grill, broil, bake, poach, steam, or stir-treviño foods.  · Limit fluids as told by your doctor.  · Weigh yourself every morning. Do this after you pee (urinate) and before you eat breakfast. Write down your weight to give to your doctor.  · Take your blood pressure and write it down if your doctor tells you to.  · Ask your doctor how to check your pulse. Check your pulse as told.  · Lose weight if told by your doctor.  · Stop smoking or chewing tobacco. Do not use gum or patches that help you quit without your doctor's approval.  · Schedule and go to doctor visits as told.  · Nonpregnant women should have no more than 1 drink a day. Men should have no more than 2 drinks a day. Talk to your doctor about drinking alcohol.  · Stop illegal drug use.  · Stay current with shots (immunizations).  · Manage your health conditions as told by your doctor.  · Learn to manage your stress.  · Rest when you are tired.  · If it is really hot outside:    Avoid intense activities.    Use air conditioning or fans, or get in a cooler place.    Avoid caffeine and alcohol.    Wear loose-fitting, lightweight, and light-colored clothing.  · If it is really cold outside:    Avoid intense activities.    Layer your clothing.    Wear mittens or gloves, a hat, and a scarf when going outside.    Avoid alcohol.  · Learn about heart failure and get support as needed.  · Get help to maintain or improve your quality of life and your ability to care for yourself as needed.  GET HELP IF:   · You gain weight quickly.  · You are more short of breath than usual.  · You cannot do your normal activities.  · You tire easily.  · You cough more than normal, especially with activity.  · You have any or more puffiness (swelling) in areas such as your hands, feet, ankles, or belly (abdomen).  · You cannot sleep because it is hard to breathe.  · You feel like your heart is beating fast (palpitations).  · You get dizzy or light-headed when you stand  up.  GET HELP RIGHT AWAY IF:   · You have trouble breathing.  · There is a change in mental status, such as becoming less alert or not being able to focus.  · You have chest pain or discomfort.  · You faint.  MAKE SURE YOU:   · Understand these instructions.  · Will watch your condition.  · Will get help right away if you are not doing well or get worse.     This information is not intended to replace advice given to you by your health care provider. Make sure you discuss any questions you have with your health care provider.     Document Released: 09/26/2009 Document Revised: 01/08/2016 Document Reviewed: 02/03/2014  [employer] Interactive Patient Education ©2016 [employer]  Edema  Edema is an abnormal buildup of fluids in your body tissues. Edema is somewhat dependent on gravity to pull the fluid to the lowest place in your body. That makes the condition more common in the legs and thighs (lower extremities). Painless swelling of the feet and ankles is common and becomes more likely as you get older. It is also common in looser tissues, like around your eyes.   When the affected area is squeezed, the fluid may move out of that spot and leave a dent for a few moments. This dent is called pitting.   CAUSES   There are many possible causes of edema. Eating too much salt and being on your feet or sitting for a long time can cause edema in your legs and ankles. Hot weather may make edema worse. Common medical causes of edema include:  · Heart failure.  · Liver disease.  · Kidney disease.  · Weak blood vessels in your legs.  · Cancer.  · An injury.  · Pregnancy.  · Some medications.  · Obesity.   SYMPTOMS   Edema is usually painless. Your skin may look swollen or shiny.   DIAGNOSIS   Your health care provider may be able to diagnose edema by asking about your medical history and doing a physical exam. You may need to have tests such as X-rays, an electrocardiogram, or blood tests to check for medical conditions that  may cause edema.   TREATMENT   Edema treatment depends on the cause. If you have heart, liver, or kidney disease, you need the treatment appropriate for these conditions. General treatment may include:  · Elevation of the affected body part above the level of your heart.  · Compression of the affected body part. Pressure from elastic bandages or support stockings squeezes the tissues and forces fluid back into the blood vessels. This keeps fluid from entering the tissues.  · Restriction of fluid and salt intake.  · Use of a water pill (diuretic). These medications are appropriate only for some types of edema. They pull fluid out of your body and make you urinate more often. This gets rid of fluid and reduces swelling, but diuretics can have side effects. Only use diuretics as directed by your health care provider.  HOME CARE INSTRUCTIONS   · Keep the affected body part above the level of your heart when you are lying down.    · Do not sit still or stand for prolonged periods.    · Do not put anything directly under your knees when lying down.  · Do not wear constricting clothing or garters on your upper legs.    · Exercise your legs to work the fluid back into your blood vessels. This may help the swelling go down.    · Wear elastic bandages or support stockings to reduce ankle swelling as directed by your health care provider.    · Eat a low-salt diet to reduce fluid if your health care provider recommends it.    · Only take medicines as directed by your health care provider.   SEEK MEDICAL CARE IF:   · Your edema is not responding to treatment.  · You have heart, liver, or kidney disease and notice symptoms of edema.  · You have edema in your legs that does not improve after elevating them.    · You have sudden and unexplained weight gain.  SEEK IMMEDIATE MEDICAL CARE IF:   · You develop shortness of breath or chest pain.    · You cannot breathe when you lie down.  · You develop pain, redness, or warmth in the  swollen areas.    · You have heart, liver, or kidney disease and suddenly get edema.  · You have a fever and your symptoms suddenly get worse.  MAKE SURE YOU:   · Understand these instructions.  · Will watch your condition.  · Will get help right away if you are not doing well or get worse.     This information is not intended to replace advice given to you by your health care provider. Make sure you discuss any questions you have with your health care provider.     Document Released: 12/18/2006 Document Revised: 01/08/2016 Document Reviewed: 10/10/2014  [employer] Interactive Patient Education ©2016 [employer]

## 2017-03-02 NOTE — PROGRESS NOTES
Subjective   Florencio Powell is a 56 y.o. male     Chief Complaint   Patient presents with   • Follow-up     presents as a follow up from hospital       HPI    Problem List:    1. Coronary artery disease  1.1 Catheterization, January 2014 indicated chronic total occlusion of the RCA with diffuse disease in the LAD and circumflex, no target for percutaneous intervention.  1.2 Repeat cath, January 2015 with stenting of the LAD and subtotal occlusion of the right with collateral flows.  1.3 Stress Test 6/16/16 - study corroborates the patient's known CAD; EF 23%  2. Dilated cardiomyopathy  2.1 Echocardiogram during hospitalization, October 2014 indicated an EF of approximately 20-25%.  2.2 EF of 35-40% by echocardiogram in April 2015.  2.3 MUGA scan, June 2015 with EF of 36%.  2.4 Echo 6/13/16 - EF 15-20%; mild LA enlargement  2.5 AICD (single chamber) placement 8/12/16 with Dr. Mckeon [employer]  2.6 Echo 8/22/16 - mod LVH; severe global hypokinesis; EF 20-25%; mild MR; trivial to mild TR; prox aortic root upper limits of normal 3.6; PA pressure in the 20s.   3. Conduction system disease status post pacemaker implantation by Dr. Bravo, October 2014.   4. Chronic Atrial fibrillation on Xarelto  5. History of CVA secondary to carotid artery stenosis, followed by Dr. Rader.   5.1 Embolic stroke of the right dewayne  6. Congestive heart failure during recent hospitalization, October 2014.  7. Hypertension  8. Obstructive sleep apnea, noncompliant with CPAP therapy.      9. Smoking Habituation     10. COPD       11. PVD       11.1 SU 10/17/16 - occlusion of the anterior tibial     Patient is a 56-year-old male who presents today for a follow-up with his sister at his side.  He had one episode of sharp midsternum chest pain when he was in the hospital for CHF exacerbation a couple of weeks ago.  He says that he got really hot when it happened and his eyes were going back and forth.  He has not had any other episodes.  He  denies any palpitations, fluttering, dizziness, presyncope, syncope, orthopnea or PND.  He says his edema is better.  He does get short of breath with any activity.  He says that he is down to 1 cig/day.  His sister says that Dr. Bravo did not feel he needed surgery and that smoking and his weight would help with his PVD and edema.  He denies any signs of bleeding or cerebral ischemic events.     Current Outpatient Prescriptions   Medication Sig Dispense Refill   • amitriptyline (ELAVIL) 100 MG tablet Daily.     • aspirin 81 MG tablet Take 1 tablet by mouth daily.     • atorvastatin (LIPITOR) 80 MG tablet Take 1 tablet by mouth every evening. 90 tablet 3   • carvedilol (COREG) 6.25 MG tablet Take 1 tablet by mouth 2 (Two) Times a Day. 60 tablet 5   • isosorbide mononitrate (IMDUR) 30 MG 24 hr tablet TAKE 1/2 TABLET DAILY. 15 tablet 5   • levothyroxine (SYNTHROID, LEVOTHROID) 75 MCG tablet Take 1 tablet by mouth daily.     • losartan (COZAAR) 25 MG tablet      • PACERONE 400 MG tablet Take 1 tablet by mouth Daily. 90 tablet 3   • rivaroxaban (XARELTO) 15 MG tablet Take 1 tablet by mouth Daily. 30 tablet 5   • furosemide (LASIX) 40 MG tablet Take 2 tablets by mouth Daily. 30 tablet 4     No current facility-administered medications for this visit.        ALLERGIES    Review of patient's allergies indicates no known allergies.    Past Medical History   Diagnosis Date   • Acute myocardial infarction    • Atrial fibrillation    • CAD (coronary artery disease), native coronary artery    • Cardiac conduction disorder    • Cardiomyopathy      Dilated, Ischemic   • Congestive heart failure    • Dizziness    • Ejection fraction < 50%    • Gout    • Hematoma    • Hematuria    • Hemoptysis    • Hyperlipidemia    • Hypertension    • Hypothyroidism    • Postprocedural pain of extremity following cardiac catheterization    • Shortness of breath    • Stroke        Social History     Social History   • Marital status:       "Spouse name: N/A   • Number of children: N/A   • Years of education: N/A     Occupational History   • Not on file.     Social History Main Topics   • Smoking status: Current Every Day Smoker     Packs/day: 0.25     Types: Cigarettes   • Smokeless tobacco: Never Used   • Alcohol use No   • Drug use: No   • Sexual activity: Not on file     Other Topics Concern   • Not on file     Social History Narrative       Family History   Problem Relation Age of Onset   • Cancer Mother    • Hypertension Mother    • Cancer Father    • Hypertension Brother        Review of Systems   Constitutional: Positive for fatigue. Negative for diaphoresis.   HENT: Negative.  Negative for rhinorrhea and sneezing.    Eyes: Positive for visual disturbance (reading glasses ).   Respiratory: Positive for shortness of breath (with anything). Negative for chest tightness.    Cardiovascular: Positive for chest pain (sharp pain middle of chest when in hospital and got real hot) and leg swelling (better ). Negative for palpitations.   Gastrointestinal: Negative for nausea and vomiting.   Endocrine: Positive for heat intolerance (got very hot and eye bothered him. also had severe chest pain when this happened. ).   Genitourinary: Negative for difficulty urinating.   Musculoskeletal: Positive for arthralgias, back pain, gait problem (uses a cane ) and neck pain.   Skin: Negative.    Allergic/Immunologic: Negative.    Neurological: Negative for dizziness, syncope and light-headedness.   Hematological: Bruises/bleeds easily.   Psychiatric/Behavioral: The patient is nervous/anxious.        Objective   Visit Vitals   • BP (!) 148/109 (BP Location: Left arm, Patient Position: Sitting)   • Pulse 89   • Ht 70\" (177.8 cm)   • Wt (!) 324 lb (147 kg)   • SpO2 98%   • BMI 46.49 kg/m2     Lab Results (most recent)     None        Physical Exam   Constitutional: He is oriented to person, place, and time. He appears well-developed and well-nourished. He is active and " cooperative.   HENT:   Head: Normocephalic.   Mouth/Throat: Abnormal dentition (missing teeth ).   Eyes: Lids are normal.   Neck: Normal carotid pulses, no hepatojugular reflux and no JVD present. Carotid bruit is present (left ).   Cardiovascular: Normal rate and normal heart sounds.  An irregularly irregular rhythm present.   Pulses:       Radial pulses are 2+ on the right side, and 2+ on the left side.   Decreased pedal pulses BLE; trace edema BLE.    Pulmonary/Chest: Effort normal and breath sounds normal.   Abdominal: Normal appearance.   Neurological: He is alert and oriented to person, place, and time.   Skin: Skin is warm, dry and intact.   PVD changes BLE.    Psychiatric: His speech is normal and behavior is normal. Judgment and thought content normal. Cognition and memory are normal.   Will cry at times       Procedure   Procedures         Assessment/Plan      Diagnosis Plan   1. Coronary arteriosclerosis in native artery     2. Essential hypertension  furosemide (LASIX) 40 MG tablet    Basic Metabolic Panel   3. Congestive cardiomyopathy  carvedilol (COREG) 6.25 MG tablet   4. Bilateral carotid artery stenosis     5. Atrial fibrillation, unspecified type     6. Shortness of breath     7. Obstructive sleep apnea     8. Dyslipidemia     9. Peripheral edema  carvedilol (COREG) 6.25 MG tablet   10. Healthcare maintenance  Basic Metabolic Panel       Return in about 6 weeks (around 4/13/2017).       Patient had interrogation today of his device with no sig findings.  He will continue his medication regimen.  He will get a BMP due to increased Lasix since in the hospital.  He will follow-up in 6 weeks or sooner if any changes.  We will reassess his symptoms to see if he has had any more episodes of CP.  I applauded him on his smoking cessation efforts.

## 2017-03-23 DIAGNOSIS — E78.5 HYPERLIPIDEMIA, UNSPECIFIED HYPERLIPIDEMIA TYPE: ICD-10-CM

## 2017-03-23 DIAGNOSIS — I25.10 CORONARY ARTERY DISEASE INVOLVING NATIVE CORONARY ARTERY OF NATIVE HEART WITHOUT ANGINA PECTORIS: ICD-10-CM

## 2017-03-23 DIAGNOSIS — I10 ESSENTIAL HYPERTENSION: ICD-10-CM

## 2017-03-23 DIAGNOSIS — R07.2 PRECORDIAL PAIN: ICD-10-CM

## 2017-03-23 RX ORDER — LOSARTAN POTASSIUM 25 MG/1
25 TABLET ORAL DAILY
Qty: 30 TABLET | Refills: 6 | Status: SHIPPED | OUTPATIENT
Start: 2017-03-23 | End: 2017-04-18

## 2017-03-23 RX ORDER — ISOSORBIDE MONONITRATE 30 MG/1
TABLET, EXTENDED RELEASE ORAL
Qty: 15 TABLET | Refills: 5 | Status: SHIPPED | OUTPATIENT
Start: 2017-03-23 | End: 2017-01-01 | Stop reason: SDUPTHER

## 2017-03-23 RX ORDER — FUROSEMIDE 40 MG/1
80 TABLET ORAL DAILY
Qty: 30 TABLET | Refills: 4
Start: 2017-03-23 | End: 2017-04-18 | Stop reason: DRUGHIGH

## 2017-03-27 ENCOUNTER — TELEPHONE (OUTPATIENT)
Dept: CARDIOLOGY | Facility: CLINIC | Age: 57
End: 2017-03-27

## 2017-03-27 NOTE — TELEPHONE ENCOUNTER
----- Message from Amanda Lombardi sent at 3/27/2017 10:51 AM EDT -----  Contact: MARTY - DAUGHTER ON HIPAA  DAUGHTER WANTS TO SEE IF PATIENT NEEDS TO BE DRIVING. SHE WAS UNDER THE IMPRESSION HE WASN'T SUPPOSED TO BE HOWEVER HE HAS BEEN.    Per VICKIE Chew as long as the patient has no history of seizures or been recently shocked by his AICD she is OK with him driving, however with history of stroke she recommends that Marty speaks with his Neurologist to make sure they are OK with the patient driving. Marty verbalizes understanding and states she will call Neurology to make sure.

## 2017-04-18 ENCOUNTER — OFFICE VISIT (OUTPATIENT)
Dept: CARDIOLOGY | Facility: CLINIC | Age: 57
End: 2017-04-18

## 2017-04-18 VITALS
HEIGHT: 70 IN | BODY MASS INDEX: 45.1 KG/M2 | OXYGEN SATURATION: 98 % | SYSTOLIC BLOOD PRESSURE: 149 MMHG | HEART RATE: 95 BPM | DIASTOLIC BLOOD PRESSURE: 102 MMHG | WEIGHT: 315 LBS

## 2017-04-18 DIAGNOSIS — G47.33 OBSTRUCTIVE SLEEP APNEA: ICD-10-CM

## 2017-04-18 DIAGNOSIS — I25.5 GENERALIZED ISCHEMIC MYOCARDIAL DYSFUNCTION: ICD-10-CM

## 2017-04-18 DIAGNOSIS — R07.2 PRECORDIAL PAIN: ICD-10-CM

## 2017-04-18 DIAGNOSIS — I10 ESSENTIAL HYPERTENSION: Primary | ICD-10-CM

## 2017-04-18 DIAGNOSIS — I48.91 ATRIAL FIBRILLATION, UNSPECIFIED TYPE (HCC): ICD-10-CM

## 2017-04-18 DIAGNOSIS — I65.23 BILATERAL CAROTID ARTERY STENOSIS: ICD-10-CM

## 2017-04-18 DIAGNOSIS — I25.10 CORONARY ARTERIOSCLEROSIS IN NATIVE ARTERY: ICD-10-CM

## 2017-04-18 DIAGNOSIS — R06.02 SHORTNESS OF BREATH: ICD-10-CM

## 2017-04-18 DIAGNOSIS — R42 DIZZINESS: ICD-10-CM

## 2017-04-18 DIAGNOSIS — E78.5 DYSLIPIDEMIA: ICD-10-CM

## 2017-04-18 DIAGNOSIS — I50.22 CHRONIC SYSTOLIC HEART FAILURE (HCC): ICD-10-CM

## 2017-04-18 PROCEDURE — 99214 OFFICE O/P EST MOD 30 MIN: CPT | Performed by: NURSE PRACTITIONER

## 2017-04-18 RX ORDER — FUROSEMIDE 80 MG
TABLET ORAL
COMMUNITY
Start: 2017-03-25 | End: 2017-01-01 | Stop reason: ALTCHOICE

## 2017-04-18 RX ORDER — LISINOPRIL 40 MG/1
TABLET ORAL DAILY
COMMUNITY
Start: 2017-02-14 | End: 2017-04-18

## 2017-04-18 RX ORDER — CLONIDINE HYDROCHLORIDE 0.1 MG/1
0.1 TABLET ORAL 3 TIMES DAILY PRN
Qty: 30 TABLET | Refills: 3 | Status: SHIPPED | OUTPATIENT
Start: 2017-04-18 | End: 2017-01-01 | Stop reason: ALTCHOICE

## 2017-04-18 RX ORDER — CLONIDINE HYDROCHLORIDE 0.1 MG/1
0.1 TABLET ORAL ONCE
Status: SHIPPED | OUTPATIENT
Start: 2017-04-18

## 2017-04-18 RX ORDER — NITROGLYCERIN 0.4 MG/1
TABLET SUBLINGUAL
Qty: 30 TABLET | Refills: 3 | Status: SHIPPED | OUTPATIENT
Start: 2017-04-18

## 2017-04-18 NOTE — PATIENT INSTRUCTIONS
"You Can Quit Smoking  If you are ready to quit smoking or are thinking about it, congratulations! You have chosen to help yourself be healthier and live longer! There are lots of different ways to quit smoking. Nicotine gum, nicotine patches, a nicotine inhaler, or nicotine nasal spray can help with physical craving. Hypnosis, support groups, and medicines help break the habit of smoking.  TIPS TO GET OFF AND STAY OFF CIGARETTES  · Learn to predict your moods. Do not let a bad situation be your excuse to have a cigarette. Some situations in your life might tempt you to have a cigarette.  · Ask friends and co-workers not to smoke around you.  · Make your home smoke-free.  · Never have \"just one\" cigarette. It leads to wanting another and another. Remind yourself of your decision to quit.  · On a card, make a list of your reasons for not smoking. Read it at least the same number of times a day as you have a cigarette. Tell yourself everyday, \"I do not want to smoke. I choose not to smoke.\"  · Ask someone at home or work to help you with your plan to quit smoking.  · Have something planned after you eat or have a cup of coffee. Take a walk or get other exercise to perk you up. This will help to keep you from overeating.  · Try a relaxation exercise to calm you down and decrease your stress. Remember, you may be tense and nervous the first two weeks after you quit. This will pass.  · Find new activities to keep your hands busy. Play with a pen, coin, or rubber band. Doodle or draw things on paper.  · Brush your teeth right after eating. This will help cut down the craving for the taste of tobacco after meals. You can try mouthwash too.  · Try gum, breath mints, or diet candy to keep something in your mouth.  IF YOU SMOKE AND WANT TO QUIT:  · Do not stock up on cigarettes. Never buy a carton. Wait until one pack is finished before you buy another.  · Never carry cigarettes with you at work or at home.  · Keep cigarettes " "as far away from you as possible. Leave them with someone else.  · Never carry matches or a lighter with you.  · Ask yourself, \"Do I need this cigarette or is this just a reflex?\"  · Bet with someone that you can quit. Put cigarette money in a [employer] bank every morning. If you smoke, you give up the money. If you do not smoke, by the end of the week, you keep the money.  · Keep trying. It takes 21 days to change a habit!  · Talk to your doctor about using medicines to help you quit. These include nicotine replacement gum, lozenges, or skin patches.     This information is not intended to replace advice given to you by your health care provider. Make sure you discuss any questions you have with your health care provider.     Document Released: 10/14/2010 Document Revised: 03/11/2013 Document Reviewed: 05/03/2016  [employer] Interactive Patient Education ©2016 [employer]  Hypertension  Hypertension, commonly called high blood pressure, is when the force of blood pumping through your arteries is too strong. Your arteries are the blood vessels that carry blood from your heart throughout your body. A blood pressure reading consists of a higher number over a lower number, such as 110/72. The higher number (systolic) is the pressure inside your arteries when your heart pumps. The lower number (diastolic) is the pressure inside your arteries when your heart relaxes. Ideally you want your blood pressure below 120/80.  Hypertension forces your heart to work harder to pump blood. Your arteries may become narrow or stiff. Having untreated or uncontrolled hypertension can cause heart attack, stroke, kidney disease, and other problems.  RISK FACTORS  Some risk factors for high blood pressure are controllable. Others are not.   Risk factors you cannot control include:   · Race. You may be at higher risk if you are .  · Age. Risk increases with age.  · Gender. Men are at higher risk than women before age 45 years. " After age 65, women are at higher risk than men.  Risk factors you can control include:  · Not getting enough exercise or physical activity.  · Being overweight.  · Getting too much fat, sugar, calories, or salt in your diet.  · Drinking too much alcohol.  SIGNS AND SYMPTOMS  Hypertension does not usually cause signs or symptoms. Extremely high blood pressure (hypertensive crisis) may cause headache, anxiety, shortness of breath, and nosebleed.  DIAGNOSIS  To check if you have hypertension, your health care provider will measure your blood pressure while you are seated, with your arm held at the level of your heart. It should be measured at least twice using the same arm. Certain conditions can cause a difference in blood pressure between your right and left arms. A blood pressure reading that is higher than normal on one occasion does not mean that you need treatment. If it is not clear whether you have high blood pressure, you may be asked to return on a different day to have your blood pressure checked again. Or, you may be asked to monitor your blood pressure at home for 1 or more weeks.  TREATMENT  Treating high blood pressure includes making lifestyle changes and possibly taking medicine. Living a healthy lifestyle can help lower high blood pressure. You may need to change some of your habits.  Lifestyle changes may include:  · Following the DASH diet. This diet is high in fruits, vegetables, and whole grains. It is low in salt, red meat, and added sugars.  · Keep your sodium intake below 2,300 mg per day.  · Getting at least 30-45 minutes of aerobic exercise at least 4 times per week.  · Losing weight if necessary.  · Not smoking.  · Limiting alcoholic beverages.  · Learning ways to reduce stress.  Your health care provider may prescribe medicine if lifestyle changes are not enough to get your blood pressure under control, and if one of the following is true:  · You are 18-59 years of age and your systolic  blood pressure is above 140.  · You are 60 years of age or older, and your systolic blood pressure is above 150.  · Your diastolic blood pressure is above 90.  · You have diabetes, and your systolic blood pressure is over 140 or your diastolic blood pressure is over 90.  · You have kidney disease and your blood pressure is above 140/90.  · You have heart disease and your blood pressure is above 140/90.  Your personal target blood pressure may vary depending on your medical conditions, your age, and other factors.  HOME CARE INSTRUCTIONS  · Have your blood pressure rechecked as directed by your health care provider.    · Take medicines only as directed by your health care provider. Follow the directions carefully. Blood pressure medicines must be taken as prescribed. The medicine does not work as well when you skip doses. Skipping doses also puts you at risk for problems.  · Do not smoke.    · Monitor your blood pressure at home as directed by your health care provider.   SEEK MEDICAL CARE IF:   · You think you are having a reaction to medicines taken.  · You have recurrent headaches or feel dizzy.  · You have swelling in your ankles.  · You have trouble with your vision.  SEEK IMMEDIATE MEDICAL CARE IF:  · You develop a severe headache or confusion.  · You have unusual weakness, numbness, or feel faint.  · You have severe chest or abdominal pain.  · You vomit repeatedly.  · You have trouble breathing.  MAKE SURE YOU:   · Understand these instructions.  · Will watch your condition.  · Will get help right away if you are not doing well or get worse.     This information is not intended to replace advice given to you by your health care provider. Make sure you discuss any questions you have with your health care provider.     Document Released: 12/18/2006 Document Revised: 05/03/2016 Document Reviewed: 10/10/2014  [employer] Interactive Patient Education ©2016 [employer]  Heart Failure  Heart failure means your heart  has trouble pumping blood. This makes it hard for your body to work well. Heart failure is usually a long-term (chronic) condition. You must take good care of yourself and follow your doctor's treatment plan.  HOME CARE  · Take your heart medicine as told by your doctor.    Do not stop taking medicine unless your doctor tells you to.    Do not skip any dose of medicine.    Refill your medicines before they run out.    Take other medicines only as told by your doctor or pharmacist.  · Stay active if told by your doctor. The elderly and people with severe heart failure should talk with a doctor about physical activity.  · Eat heart-healthy foods. Choose foods that are without trans fat and are low in saturated fat, cholesterol, and salt (sodium). This includes fresh or frozen fruits and vegetables, fish, lean meats, fat-free or low-fat dairy foods, whole grains, and high-fiber foods. Lentils and dried peas and beans (legumes) are also good choices.  · Limit salt if told by your doctor.  · Cook in a healthy way. Roast, grill, broil, bake, poach, steam, or stir-treviño foods.  · Limit fluids as told by your doctor.  · Weigh yourself every morning. Do this after you pee (urinate) and before you eat breakfast. Write down your weight to give to your doctor.  · Take your blood pressure and write it down if your doctor tells you to.  · Ask your doctor how to check your pulse. Check your pulse as told.  · Lose weight if told by your doctor.  · Stop smoking or chewing tobacco. Do not use gum or patches that help you quit without your doctor's approval.  · Schedule and go to doctor visits as told.  · Nonpregnant women should have no more than 1 drink a day. Men should have no more than 2 drinks a day. Talk to your doctor about drinking alcohol.  · Stop illegal drug use.  · Stay current with shots (immunizations).  · Manage your health conditions as told by your doctor.  · Learn to manage your stress.  · Rest when you are  tired.  · If it is really hot outside:    Avoid intense activities.    Use air conditioning or fans, or get in a cooler place.    Avoid caffeine and alcohol.    Wear loose-fitting, lightweight, and light-colored clothing.  · If it is really cold outside:    Avoid intense activities.    Layer your clothing.    Wear mittens or gloves, a hat, and a scarf when going outside.    Avoid alcohol.  · Learn about heart failure and get support as needed.  · Get help to maintain or improve your quality of life and your ability to care for yourself as needed.  GET HELP IF:   · You gain weight quickly.  · You are more short of breath than usual.  · You cannot do your normal activities.  · You tire easily.  · You cough more than normal, especially with activity.  · You have any or more puffiness (swelling) in areas such as your hands, feet, ankles, or belly (abdomen).  · You cannot sleep because it is hard to breathe.  · You feel like your heart is beating fast (palpitations).  · You get dizzy or light-headed when you stand up.  GET HELP RIGHT AWAY IF:   · You have trouble breathing.  · There is a change in mental status, such as becoming less alert or not being able to focus.  · You have chest pain or discomfort.  · You faint.  MAKE SURE YOU:   · Understand these instructions.  · Will watch your condition.  · Will get help right away if you are not doing well or get worse.     This information is not intended to replace advice given to you by your health care provider. Make sure you discuss any questions you have with your health care provider.     Document Released: 09/26/2009 Document Revised: 01/08/2016 Document Reviewed: 02/03/2014  [employer] Interactive Patient Education ©2016 [employer]

## 2017-04-18 NOTE — PROGRESS NOTES
Subjective   Florencio Powell is a 56 y.o. male     Chief Complaint   Patient presents with   • Follow-up     6 WEEK       HPI    Problem List:    1. Coronary artery disease  1.1 Catheterization, January 2014 indicated chronic total occlusion of the RCA with diffuse disease in the LAD and circumflex, no target for percutaneous intervention.  1.2 Repeat cath, January 2015 with stenting of the LAD and subtotal occlusion of the right with collateral flows.  1.3 Stress Test 6/16/16 - study corroborates the patient's known CAD; EF 23%  2. Dilated cardiomyopathy  2.1 Echocardiogram during hospitalization, October 2014 indicated an EF of approximately 20-25%.  2.2 EF of 35-40% by echocardiogram in April 2015.  2.3 MUGA scan, June 2015 with EF of 36%.  2.4 Echo 6/13/16 - EF 15-20%; mild LA enlargement  2.5 AICD (single chamber) placement 8/12/16 with Dr. Mckeon [facility]  2.6 Echo 8/22/16 - mod LVH; severe global hypokinesis; EF 20-25%; mild MR; trivial to mild TR; prox aortic root upper limits of normal 3.6; PA pressure in the 20s.   3. Conduction system disease status post pacemaker implantation by Dr. Bravo, October 2014.   4. Chronic Atrial fibrillation on Xarelto  5. History of CVA secondary to carotid artery stenosis, followed by Dr. Rader.   5.1 Embolic stroke of the right dewayne  6. Congestive heart failure during recent hospitalization, October 2014.  7. Hypertension  8. Obstructive sleep apnea, noncompliant with CPAP therapy.      9. Smoking Habituation     10. COPD       11. PVD       11.1 SU 10/17/16 - occlusion of the anterior tibial     Patient is a 56-year-old male who presents today for a follow-up with his sister at his side. He says he does get burning midsternum, like heartburn, which he says is what he had when he had his first MI.  He says that he does not have any other symptoms and has not taken any nitro as all of his dumped out in his pockets.  He denies any palpitations or fluttering.  He says  he will have dizziness at times, but blames it on his eyes/vision.  He denies any presyncope, syncope, orthopnea or PND.  He says his edema is about the same.  He will get short of breath if he does more than normal.  He went to his PCP a couple of weeks ago per his sister and he had sig amount of swelling.  His PCP put him back on 80 mg of lasix in AM and 40 mg of lasix in the evening.  He denies any signs of bleeding or cerebral ischemic event.      Patient is not being really compliant with his daily weights at all.  I advised him of the importance of doing his part, watching his salt/fluid intake, weighing himself, taking his meds as prescribed, etc to keep him out of the hospital.      He had lisinopril and losartan on his medication list again.  I called Liliane after he left and confirmed he had not gotten lisinopril in some time.    Current Outpatient Prescriptions   Medication Sig Dispense Refill   • amitriptyline (ELAVIL) 100 MG tablet Daily.     • aspirin 81 MG tablet Take 1 tablet by mouth daily.     • atorvastatin (LIPITOR) 80 MG tablet Take 1 tablet by mouth every evening. 90 tablet 3   • carvedilol (COREG) 6.25 MG tablet Take 1 tablet by mouth 2 (Two) Times a Day. 60 tablet 5   • furosemide (LASIX) 80 MG tablet 80 MG IN AM AND 40 MG IN PM     • isosorbide mononitrate (IMDUR) 30 MG 24 hr tablet TAKE 1/2 TABLET DAILY. 15 tablet 5   • levothyroxine (SYNTHROID, LEVOTHROID) 75 MCG tablet Take 1 tablet by mouth daily.     • metFORMIN (GLUCOPHAGE) 500 MG tablet 4 (Four) Times a Day.     • PACERONE 400 MG tablet Take 1 tablet by mouth Daily. 90 tablet 3   • rivaroxaban (XARELTO) 15 MG tablet Take 1 tablet by mouth Daily. 30 tablet 5   • CloNIDine (CATAPRES) 0.1 MG tablet Take 1 tablet by mouth 3 (Three) Times a Day As Needed for High Blood Pressure (SBP > 160 OR DBP > 90). 30 tablet 3   • nitroglycerin (NITROSTAT) 0.4 MG SL tablet 1 under the tongue as needed for angina, may repeat q5mins for up three doses 30  tablet 3   • sacubitril-valsartan (ENTRESTO) 24-26 MG tablet Take 1 tablet by mouth 2 (Two) Times a Day. 28 tablet 0     Current Facility-Administered Medications   Medication Dose Route Frequency Provider Last Rate Last Dose   • CloNIDine (CATAPRES) tablet 0.1 mg  0.1 mg Oral Once VICKIE Cruz           ALLERGIES    Review of patient's allergies indicates no known allergies.    Past Medical History:   Diagnosis Date   • Acute myocardial infarction    • Atrial fibrillation    • CAD (coronary artery disease), native coronary artery    • Cardiac conduction disorder    • Cardiomyopathy     Dilated, Ischemic   • Congestive heart failure    • Dizziness    • Ejection fraction < 50%    • Gout    • Hematoma    • Hematuria    • Hemoptysis    • Hyperlipidemia    • Hypertension    • Hypothyroidism    • Postprocedural pain of extremity following cardiac catheterization    • Shortness of breath    • Stroke        Social History     Social History   • Marital status:      Spouse name: N/A   • Number of children: N/A   • Years of education: N/A     Occupational History   • Not on file.     Social History Main Topics   • Smoking status: Current Every Day Smoker     Packs/day: 0.25     Types: Cigarettes   • Smokeless tobacco: Never Used   • Alcohol use No   • Drug use: No   • Sexual activity: Not on file     Other Topics Concern   • Not on file     Social History Narrative       Family History   Problem Relation Age of Onset   • Cancer Mother    • Hypertension Mother    • Cancer Father    • Hypertension Brother        Review of Systems   Constitutional: Positive for fatigue (ONLY WITH ACTIVITY). Negative for diaphoresis.   HENT: Positive for rhinorrhea and sneezing.    Eyes: Positive for visual disturbance (READING GLASSES).   Respiratory: Positive for shortness of breath (OCCAS; IF EXERT SELF MORE; JUST MOWED WITH RIDING MOWER ). Negative for chest tightness.    Cardiovascular: Positive for chest pain (OCCAS;  "BURNING FEELING MIDSTERNUM, LIKE WHAT HE HAD WHEN HE HAD HIS FIRST MI  ) and leg swelling (ABOUT THE SAME ). Negative for palpitations.   Gastrointestinal: Positive for constipation and nausea. Negative for vomiting.   Endocrine: Negative.    Genitourinary: Negative for difficulty urinating.   Musculoskeletal: Positive for back pain, gait problem (USES A CANE ) and neck pain. Negative for arthralgias.   Skin: Positive for color change (FACIAL REDNESS AND SEVERE SUNBURN TO bIAL. LOWER ARMS/HANDS WITH EDEMA TO HANDS/FINGERS).   Allergic/Immunologic: Negative.    Neurological: Positive for dizziness (OCCAS; SOMETHING WRONG WITH HIS EYES). Negative for syncope and light-headedness.   Hematological: Bruises/bleeds easily (BLEED).   Psychiatric/Behavioral: Negative.        Objective   BP (!) 149/102 (BP Location: Right arm, Patient Position: Sitting)  Pulse 95  Ht 70\" (177.8 cm)  Wt (!) 338 lb (153 kg)  SpO2 98%  BMI 48.5 kg/m2  Lab Results (most recent)     None        Physical Exam   Constitutional: He is oriented to person, place, and time. He appears well-developed and well-nourished. He is active and cooperative.   HENT:   Head: Normocephalic.   Mouth/Throat: Abnormal dentition (poor dentation ).   Eyes: Lids are normal.   Neck: Normal carotid pulses, no hepatojugular reflux and no JVD present. Carotid bruit is not present.   Cardiovascular: Normal rate, regular rhythm and normal heart sounds.    Pulses:       Radial pulses are 2+ on the right side, and 2+ on the left side.   1+ EDEMA BLE; DECREASED PEDAL PULSES BLE   Pulmonary/Chest: Effort normal and breath sounds normal.   Abdominal: Normal appearance.   Musculoskeletal:   USES CANE    Neurological: He is alert and oriented to person, place, and time.   Skin: Skin is warm, dry and intact. Burn (BUE AND FACE ) noted.   PVD changes BLE.    Psychiatric: He has a normal mood and affect. His speech is normal and behavior is normal. Judgment and thought content " normal. Cognition and memory are normal.         Assessment/Plan      Diagnosis Plan   1. Essential hypertension  CloNIDine (CATAPRES) tablet 0.1 mg    CloNIDine (CATAPRES) 0.1 MG tablet    Stress Test With Myocardial Perfusion One Day    Adult Transthoracic Echo Complete    Stress Test With Myocardial Perfusion One Day    Adult Transthoracic Echo Complete   2. Chronic systolic heart failure  sacubitril-valsartan (ENTRESTO) 24-26 MG tablet    Stress Test With Myocardial Perfusion One Day    Adult Transthoracic Echo Complete    Stress Test With Myocardial Perfusion One Day    Adult Transthoracic Echo Complete   3. Generalized ischemic myocardial dysfunction  Stress Test With Myocardial Perfusion One Day    Stress Test With Myocardial Perfusion One Day   4. Coronary arteriosclerosis in native artery  Stress Test With Myocardial Perfusion One Day    Adult Transthoracic Echo Complete    Stress Test With Myocardial Perfusion One Day    Adult Transthoracic Echo Complete   5. Bilateral carotid artery stenosis     6. Atrial fibrillation, unspecified type  Stress Test With Myocardial Perfusion One Day    Stress Test With Myocardial Perfusion One Day   7. Obstructive sleep apnea     8. Dyslipidemia  Stress Test With Myocardial Perfusion One Day    Stress Test With Myocardial Perfusion One Day   9. Precordial pain  Stress Test With Myocardial Perfusion One Day    Adult Transthoracic Echo Complete    nitroglycerin (NITROSTAT) 0.4 MG SL tablet    Stress Test With Myocardial Perfusion One Day    Adult Transthoracic Echo Complete   10. Shortness of breath  Stress Test With Myocardial Perfusion One Day    Adult Transthoracic Echo Complete    Stress Test With Myocardial Perfusion One Day    Adult Transthoracic Echo Complete   11. Dizziness  US Carotid Bilateral    US Carotid Bilateral       Return NURSE VISIT BP IN 10 DAYS .    Patient will have an ischemia work-up, stress and echo.  He will have a carotid ultrasound.  He will stop  his losartan today and start Entresto tomorrow.  I gave him enough samples for the first two weeks at this dose.  He will continue his medication regimen otherwise for now.  He will follow-up for a nurse visit in 10 days for BP assessment so we can increase the Entresto if possible.  He will use Clonidine PRN for SBP > 160 and DBP > 90.  He will get another follow-up appt based on findings in nurse visit.  He was told to avoid the sun, he got really burned while out mowing without any sunscreen.      Called and spoke with Florencio's sister.  Confirmed he had no lisinopril at home.  He will start Entresto tomorrow and stop his Losartan.  He will go back to only 80 mg of lasix/day.  His Cr.is 2.2 which is what it was in Nov and I advised her of this.      I will have a copy of the labs faxed to his PCP as well.

## 2017-04-28 ENCOUNTER — CLINICAL SUPPORT (OUTPATIENT)
Dept: CARDIOLOGY | Facility: CLINIC | Age: 57
End: 2017-04-28

## 2017-04-28 VITALS
BODY MASS INDEX: 45.1 KG/M2 | WEIGHT: 315 LBS | HEIGHT: 70 IN | OXYGEN SATURATION: 98 % | HEART RATE: 85 BPM | SYSTOLIC BLOOD PRESSURE: 122 MMHG | DIASTOLIC BLOOD PRESSURE: 90 MMHG

## 2017-04-28 DIAGNOSIS — I42.9 CARDIOMYOPATHY (HCC): ICD-10-CM

## 2017-04-28 DIAGNOSIS — I10 ESSENTIAL HYPERTENSION: Primary | ICD-10-CM

## 2017-04-28 DIAGNOSIS — R06.02 SOB (SHORTNESS OF BREATH): ICD-10-CM

## 2017-04-28 DIAGNOSIS — I50.22 CHRONIC SYSTOLIC HEART FAILURE (HCC): ICD-10-CM

## 2017-04-28 NOTE — PROGRESS NOTES
Florencio Powell  1960 4/28/2017   ?   Chief Complaint   Patient presents with   • Follow-up     patient appears in office today to have his blood pressure checked; started Entresto on 04/18/2017      ?   HPI:   Patient appears in office today for B/P check per Pema JOHNSTON; patient was started on Entresto 24-26 mg BID on 04/18/2017 and was notified to come back in and have nurse visit with B/P check today. Patient blood pressure was taken as directed and was 122/90. Patient stated he was not having any new onset side effects and was feeling okay. ?   ?   ?     Current Outpatient Prescriptions:   •  amitriptyline (ELAVIL) 100 MG tablet, Daily., Disp: , Rfl:   •  aspirin 81 MG tablet, Take 1 tablet by mouth daily., Disp: , Rfl:   •  atorvastatin (LIPITOR) 80 MG tablet, Take 1 tablet by mouth every evening., Disp: 90 tablet, Rfl: 3  •  carvedilol (COREG) 6.25 MG tablet, Take 1 tablet by mouth 2 (Two) Times a Day., Disp: 60 tablet, Rfl: 5  •  CloNIDine (CATAPRES) 0.1 MG tablet, Take 1 tablet by mouth 3 (Three) Times a Day As Needed for High Blood Pressure (SBP > 160 OR DBP > 90)., Disp: 30 tablet, Rfl: 3  •  furosemide (LASIX) 80 MG tablet, 80 MG IN AM AND 40 MG IN PM, Disp: , Rfl:   •  isosorbide mononitrate (IMDUR) 30 MG 24 hr tablet, TAKE 1/2 TABLET DAILY., Disp: 15 tablet, Rfl: 5  •  levothyroxine (SYNTHROID, LEVOTHROID) 75 MCG tablet, Take 1 tablet by mouth daily., Disp: , Rfl:   •  metFORMIN (GLUCOPHAGE) 500 MG tablet, 4 (Four) Times a Day., Disp: , Rfl:   •  nitroglycerin (NITROSTAT) 0.4 MG SL tablet, 1 under the tongue as needed for angina, may repeat q5mins for up three doses, Disp: 30 tablet, Rfl: 3  •  PACERONE 400 MG tablet, Take 1 tablet by mouth Daily., Disp: 90 tablet, Rfl: 3  •  rivaroxaban (XARELTO) 15 MG tablet, Take 1 tablet by mouth Daily., Disp: 30 tablet, Rfl: 5  •  sacubitril-valsartan (ENTRESTO) 24-26 MG tablet, Take 1 tablet by mouth 2 (Two) Times a Day., Disp: 28 tablet, Rfl:  0    Current Facility-Administered Medications:   •  CloNIDine (CATAPRES) tablet 0.1 mg, 0.1 mg, Oral, Once, VICKIE Cruz   ?   ?   Review of patient's allergies indicates no known allergies.       Procedures     ?   Assessment/Plan      1. Essential HTN    Patient blood pressure was check as verbal order Pema JOHNSTON. Blood pressure was reported and dose on Entresto was increased to 49-51 mg 1 p.o. BID and follow up with Pema JOHNSTON in 1 month. All orders were verbalized and read back to Pema JOHNSTON ; patient verbalized all orders and understood. -BH; [facility]       ?   ?   ?

## 2017-05-16 DIAGNOSIS — I48.0 PAROXYSMAL ATRIAL FIBRILLATION (HCC): ICD-10-CM

## 2017-05-16 RX ORDER — RIVAROXABAN 15 MG/1
TABLET, FILM COATED ORAL
Qty: 30 TABLET | Refills: 5 | Status: SHIPPED | OUTPATIENT
Start: 2017-05-16 | End: 2018-01-01 | Stop reason: SDUPTHER

## 2017-05-31 ENCOUNTER — APPOINTMENT (OUTPATIENT)
Dept: CARDIOLOGY | Facility: HOSPITAL | Age: 57
End: 2017-05-31

## 2017-05-31 ENCOUNTER — HOSPITAL ENCOUNTER (OUTPATIENT)
Dept: CARDIOLOGY | Facility: HOSPITAL | Age: 57
Discharge: HOME | End: 2017-05-31

## 2017-05-31 ENCOUNTER — OUTSIDE FACILITY SERVICE (OUTPATIENT)
Dept: CARDIOLOGY | Facility: CLINIC | Age: 57
End: 2017-05-31

## 2017-05-31 PROCEDURE — 93880 EXTRACRANIAL BILAT STUDY: CPT | Performed by: INTERNAL MEDICINE

## 2017-05-31 PROCEDURE — 93880 EXTRACRANIAL BILAT STUDY: CPT

## 2017-05-31 PROCEDURE — 93306 TTE W/DOPPLER COMPLETE: CPT | Performed by: INTERNAL MEDICINE

## 2017-05-31 PROCEDURE — 93306 TTE W/DOPPLER COMPLETE: CPT

## 2017-06-01 ENCOUNTER — DOCUMENTATION (OUTPATIENT)
Dept: CARDIOLOGY | Facility: CLINIC | Age: 57
End: 2017-06-01

## 2017-06-06 ENCOUNTER — OUTSIDE FACILITY SERVICE (OUTPATIENT)
Dept: CARDIOLOGY | Facility: CLINIC | Age: 57
End: 2017-06-06

## 2017-06-06 ENCOUNTER — HOSPITAL ENCOUNTER (OUTPATIENT)
Dept: CARDIOLOGY | Facility: HOSPITAL | Age: 57
Discharge: HOME | End: 2017-06-06

## 2017-06-06 LAB
MAXIMAL PREDICTED HEART RATE: 166 BPM
STRESS TARGET HR: 141 BPM

## 2017-06-06 PROCEDURE — 93018 CV STRESS TEST I&R ONLY: CPT | Performed by: INTERNAL MEDICINE

## 2017-06-06 PROCEDURE — 93017 CV STRESS TEST TRACING ONLY: CPT

## 2017-06-06 PROCEDURE — 78452 HT MUSCLE IMAGE SPECT MULT: CPT | Performed by: INTERNAL MEDICINE

## 2017-06-06 PROCEDURE — A9500 TC99M SESTAMIBI: HCPCS | Performed by: INTERNAL MEDICINE

## 2017-06-06 PROCEDURE — 25010000002 REGADENOSON 0.4 MG/5ML SOLUTION: Performed by: INTERNAL MEDICINE

## 2017-06-06 PROCEDURE — 0 TECHNETIUM SESTAMIBI: Performed by: INTERNAL MEDICINE

## 2017-06-06 PROCEDURE — 78452 HT MUSCLE IMAGE SPECT MULT: CPT

## 2017-06-06 RX ADMIN — REGADENOSON 0.4 MG: 0.08 INJECTION, SOLUTION INTRAVENOUS at 08:30

## 2017-06-06 RX ADMIN — Medication 1 DOSE: at 08:30

## 2017-06-13 PROBLEM — L97.929 ULCER OF LEG, CHRONIC, LEFT (HCC): Status: ACTIVE | Noted: 2017-01-01

## 2017-06-13 PROBLEM — I25.10 CORONARY ARTERY DISEASE INVOLVING NATIVE CORONARY ARTERY OF NATIVE HEART WITHOUT ANGINA PECTORIS: Status: ACTIVE | Noted: 2017-01-01

## 2017-06-13 PROBLEM — R07.9 CHEST PAIN: Status: ACTIVE | Noted: 2017-01-01

## 2017-06-13 PROBLEM — R68.89 ABNORMAL ANKLE BRACHIAL INDEX: Status: ACTIVE | Noted: 2017-01-01

## 2017-06-13 PROBLEM — I73.9 PAD (PERIPHERAL ARTERY DISEASE) (HCC): Status: ACTIVE | Noted: 2017-01-01

## 2017-06-13 NOTE — PROGRESS NOTES
Problem list     Subjective   Florencio Powell is a 56 y.o. male     Chief Complaint   Patient presents with   • Follow-up     1-2 week echo f/u       HPI    1. Coronary artery disease  1.1 Catheterization, January 2014 indicated chronic total occlusion of the RCA with diffuse disease in the LAD and circumflex, no target for percutaneous intervention.  1.2 Repeat cath, January 2015 with stenting of the LAD and subtotal occlusion of the right with collateral flows.  1.3 Stress Test 6/16/16 - study corroborates the patient's known CAD; EF 23%  1.4 stress test May 2017 demonstrated inferoposterolateral infarct with severely depressed systolic function  2. Dilated cardiomyopathy  2.1 Echocardiogram during hospitalization, October 2014 indicated an EF of approximately 20-25%.  2.2 EF of 35-40% by echocardiogram in April 2015.  2.3 MUGA scan, June 2015 with EF of 36%.  2.4 Echo 6/13/16 - EF 15-20%  2.5 Pacemaker upgrade to AICD (single chamber) placement 8/12/16 with Dr. Mckeon [employer]  3. Conduction system disease status post pacemaker implantation by Dr. Bravo, October 2014.   4. Chronic Atrial fibrillation on Xarelto  5. History of CVA secondary to carotid artery stenosis, followed by Dr. Rader.   5.1 Embolic stroke of the right dewayne  6. Congestive heart failure during recent hospitalization, October 2014.  7. Hypertension  8. Obstructive sleep apnea, noncompliant with CPAP therapy.  9. Smoking Habituation  10. COPD   11. PVD   11.1 SU 10/17/16 - occlusion of the anterior tibial   11.2 nonobstructive carotid artery stenosis by ultrasound May 2017    Patient is a 56-year-old male that presents back for follow-up. He describes to me that occasionally he will get chest discomfort. He describes this as a substernal discomfort that he feels is mainly related to indigestion. It happens on occasion. Although, there are some features of his discomfort that he feels is similar to previous stenting. He has moderate  dyspnea when trying to exert do activity. He denies PND orthopnea. He does not palpitate or have dysrhythmic symptoms.    He complains of lower extremity edema. He complains of ulcerations of his lower extremities as well. Otherwise voices no complaints          Outpatient Encounter Prescriptions as of 6/13/2017   Medication Sig Dispense Refill   • amitriptyline (ELAVIL) 100 MG tablet Daily.     • aspirin 81 MG tablet Take 1 tablet by mouth daily.     • atorvastatin (LIPITOR) 80 MG tablet Take 1 tablet by mouth every evening. 90 tablet 3   • CloNIDine (CATAPRES) 0.1 MG tablet Take 1 tablet by mouth 3 (Three) Times a Day As Needed for High Blood Pressure (SBP > 160 OR DBP > 90). 30 tablet 3   • furosemide (LASIX) 80 MG tablet 80 MG IN AM AND 40 MG IN PM     • isosorbide mononitrate (IMDUR) 30 MG 24 hr tablet TAKE 1/2 TABLET DAILY. 15 tablet 5   • levothyroxine (SYNTHROID, LEVOTHROID) 75 MCG tablet Take 1 tablet by mouth daily.     • metFORMIN (GLUCOPHAGE) 500 MG tablet 4 (Four) Times a Day.     • PACERONE 400 MG tablet Take 1 tablet by mouth Daily. 90 tablet 3   • XARELTO 15 MG tablet TAKE ONE TABLET BY MOUTH DAILY 30 tablet 5   • [DISCONTINUED] carvedilol (COREG) 6.25 MG tablet Take 1 tablet by mouth 2 (Two) Times a Day. 60 tablet 5   • carvedilol (COREG) 12.5 MG tablet Take 1 tablet by mouth 2 (Two) Times a Day. 60 tablet 11   • nitroglycerin (NITROSTAT) 0.4 MG SL tablet 1 under the tongue as needed for angina, may repeat q5mins for up three doses 30 tablet 3   • sacubitril-valsartan (ENTRESTO) 49-51 MG tablet Take 1 tablet by mouth 2 (Two) Times a Day. 60 tablet 5     Facility-Administered Encounter Medications as of 6/13/2017   Medication Dose Route Frequency Provider Last Rate Last Dose   • CloNIDine (CATAPRES) tablet 0.1 mg  0.1 mg Oral Once VICKIE Cruz           Review of patient's allergies indicates no known allergies.    Past Medical History:   Diagnosis Date   • Acute myocardial infarction    •  "Atrial fibrillation    • CAD (coronary artery disease), native coronary artery    • Cardiac conduction disorder    • Cardiomyopathy     Dilated, Ischemic   • Congestive heart failure    • Dizziness    • Ejection fraction < 50%    • Gout    • Hematoma    • Hematuria    • Hemoptysis    • Hyperlipidemia    • Hypertension    • Hypothyroidism    • Postprocedural pain of extremity following cardiac catheterization    • Shortness of breath    • Stroke        Social History     Social History   • Marital status:      Spouse name: N/A   • Number of children: N/A   • Years of education: N/A     Occupational History   • Not on file.     Social History Main Topics   • Smoking status: Current Every Day Smoker     Packs/day: 0.25     Types: Cigarettes   • Smokeless tobacco: Never Used   • Alcohol use No   • Drug use: No   • Sexual activity: Not on file     Other Topics Concern   • Not on file     Social History Narrative       Family History   Problem Relation Age of Onset   • Cancer Mother    • Hypertension Mother    • Cancer Father    • Hypertension Brother        Review of Systems   Constitutional: Negative.    HENT: Negative.    Eyes: Positive for visual disturbance (reading glasses).   Respiratory: Positive for shortness of breath (with activity).    Cardiovascular: Positive for chest pain and leg swelling. Negative for palpitations.   Gastrointestinal: Negative.    Endocrine: Negative.    Genitourinary: Negative.    Musculoskeletal: Negative.    Skin: Negative.    Allergic/Immunologic: Negative.    Neurological: Negative.    Hematological: Bruises/bleeds easily.   Psychiatric/Behavioral: Negative.        Objective     /98 (BP Location: Left arm, Patient Position: Sitting)  Pulse 88  Ht 70\" (177.8 cm)  Wt (!) 324 lb 9.6 oz (147 kg)  SpO2 99%  BMI 46.58 kg/m2    Lab Results (most recent)     None          Physical Exam   Constitutional: He is oriented to person, place, and time. He appears well-developed " and well-nourished. No distress.   HENT:   Head: Normocephalic and atraumatic.   Eyes: EOM are normal. Pupils are equal, round, and reactive to light.   Neck: No JVD present.   Cardiovascular: Normal rate, regular rhythm and normal heart sounds.  Exam reveals no gallop and no friction rub.    No murmur heard.  Pulmonary/Chest: Effort normal and breath sounds normal. No respiratory distress. He has no wheezes. He has no rales. He exhibits no tenderness.   Abdominal: Soft.   Musculoskeletal: Normal range of motion. He exhibits edema.   Neurological: He is alert and oriented to person, place, and time. No cranial nerve deficit.   Skin: Skin is warm and dry. No rash noted. No erythema. No pallor.   Psychiatric: He has a normal mood and affect. His behavior is normal.   Nursing note and vitals reviewed.      Procedure   Procedures       Assessment/Plan     Problems Addressed this Visit        Cardiovascular and Mediastinum    PAD (peripheral artery disease)    Relevant Orders    CT Angio Abdominal Aorta Bilateral Iliofem Runoff With & Without Contrast    Coronary artery disease involving native coronary artery of native heart without angina pectoris    Relevant Medications    carvedilol (COREG) 12.5 MG tablet    Other Relevant Orders    CT Angio Abdominal Aorta Bilateral Iliofem Runoff With & Without Contrast    Abnormal ankle brachial index    Relevant Orders    CT Angio Abdominal Aorta Bilateral Iliofem Runoff With & Without Contrast       Respiratory    Shortness of breath    Relevant Orders    CT Angio Abdominal Aorta Bilateral Iliofem Runoff With & Without Contrast       Nervous and Auditory    Chest pain    Relevant Orders    CT Angio Abdominal Aorta Bilateral Iliofem Runoff With & Without Contrast       Other    Ulcer of leg, chronic, left - Primary    Relevant Orders    Ambulatory Referral to Wound Clinic    CT Angio Abdominal Aorta Bilateral Iliofem Runoff With & Without Contrast           recommendation  1.  Stress test and echocardiogram findings appear to show prior infarct but no evidence of worsening systolic function nor evidence of ischemia distant from prior infarct. However, I am concerned the patient has persistent chest discomfort. I recommended antianginal therapy and even repeat catheterization if he felt medical management based on the severity of his systolic function and known coronary artery disease. Patient does not seem interested although that is my recommendation at this time as he is aware of the risk of MI.  2. Patient had abnormal SU demonstrating significant lower extremity disease and I will like to follow that up with a CT angiogram and runoff in the setting the patient has ulcerations of his lower extremities concerning for him. Arterial circulation. Patient also has significant diabetes as well which could be contributing.  3. He is on appropriate medications for his cardiomyopathy. I would like to titrate up patient's carvedilol to hopefully improve systolic function he is already on entresto.  4. After much debate, he has agreed to go on isosorbide.  5. We will like to see him back in 3-4 weeks for follow-up. If symptoms worsen he will contact her office. Follow-up with primary as scheduled

## 2017-06-20 NOTE — TELEPHONE ENCOUNTER
Spoke with patient's daughter and his sister.  He is currently in ICU at hospital with resp failure due to pulmonary edema.  Per patient's daughter he went to the ER about 9 am yesterday and was sent home with nebs.  He was found around 10 pm at home with AMS and breathing difficulties.  He was taken to hospital via EMS and placed on a vent.  They are waiting for pulmonary to see the patient at this point and trying to get fluid off of his lungs. They will keep us updated.

## 2017-08-10 NOTE — TELEPHONE ENCOUNTER
Reviewed chart with DEL Tomlinson. With Mr. Powell's last stress being abnormal and pt symptoms, Ramy does not want to clear patient until next visit on Tuesday. patient daughter, Edinson, verbalize understanding.     ----- Message from Soniya Briseno sent at 8/10/2017  9:27 AM EDT -----  Contact: EDINSON (DAUGHTER)  THE PATIENTS DAUGHTER CALLED STATES HER DAD IS GOING TO HAVE PT AND OT, THEY ARE TELLING HER BEFORE HE CAN START THAT THEY NEED A WRITTEN STATEMENT FROM US.  SHE CAN BE REACHED [phone].  THANKS

## 2017-08-15 PROBLEM — I42.9 CARDIOMYOPATHY (HCC): Status: ACTIVE | Noted: 2017-01-01

## 2017-08-15 NOTE — PROGRESS NOTES
Problem list     Subjective   Florencio Powell is a 57 y.o. male     Chief Complaint   Patient presents with   • Shortness of Breath     patient presents as a hospital follow up       HPI      1. Coronary artery disease  1.1 Catheterization, January 2014 indicated chronic total occlusion of the RCA with diffuse disease in the LAD and circumflex, no target for percutaneous intervention.  1.2 Repeat cath, January 2015 with stenting of the LAD and subtotal occlusion of the right with collateral flows.  1.3 Stress Test 6/16/16 - study corroborates the patient's known CAD; EF 23%  1.4 stress test May 2017 demonstrated inferoposterolateral infarct with severely depressed systolic function  2. Dilated cardiomyopathy  2.1 Echocardiogram during hospitalization, October 2014 indicated an EF of approximately 20-25%.  2.2 EF of 35-40% by echocardiogram in April 2015.  2.3 MUGA scan, June 2015 with EF of 36%.  2.4 Echo 6/13/16 - EF 15-20%  2.5 Pacemaker upgrade to AICD (single chamber) placement 8/12/16 with Dr. Mckeon [employer]  3. Conduction system disease status post pacemaker implantation by Dr. Bravo, October 2014.   4. Chronic Atrial fibrillation on Xarelto  5. History of CVA secondary to carotid artery stenosis, followed by Dr. Rader.   5.1 Embolic stroke of the right dewayne  6. Congestive heart failure during recent hospitalization, October 2014.  7. Hypertension  8. Obstructive sleep apnea, noncompliant with CPAP therapy.  9. Smoking Habituation  10. COPD   11. PVD   11.1 SU 10/17/16 - occlusion of the anterior tibial   11.2 nonobstructive carotid artery stenosis by ultrasound May 2017    Patient is a 57-year-old male that presents back for hospital follow-up.  Recently was admitted for respiratory failure and acute on chronic heart failure.  Patient had to be intubated because of respiratory status.  He underwent IV diuresis.  He was transferred to [facility].  The plan was to upgrade patient's AICD to a  biventricular device.  He has not had that done at this point but is to follow-up soon with [facility] cardiology.    He has no chest pain or pressure.  His dyspnea has improved.  Patient is monitoring weights and is compliant with diuretic regimen.  He denies PND orthopnea.  He doesn't palpitate or have dysrhythmic symptoms.  Otherwise is doing well    Outpatient Encounter Prescriptions as of 8/15/2017   Medication Sig Dispense Refill   • amitriptyline (ELAVIL) 100 MG tablet Take 100 mg by mouth Daily.     • aspirin 81 MG tablet Take 1 tablet by mouth daily.     • atorvastatin (LIPITOR) 80 MG tablet Take 1 tablet by mouth every evening. 90 tablet 3   • bumetanide (BUMEX) 1 MG tablet Take 2 mg by mouth 2 (Two) Times a Day.     • carvedilol (COREG) 12.5 MG tablet Take 1 tablet by mouth 2 (Two) Times a Day. 60 tablet 11   • isosorbide mononitrate (IMDUR) 30 MG 24 hr tablet TAKE 1/2 TABLET DAILY. 15 tablet 5   • levothyroxine (SYNTHROID, LEVOTHROID) 75 MCG tablet Take 1 tablet by mouth daily.     • metFORMIN (GLUCOPHAGE) 500 MG tablet Take 500 mg by mouth 2 (Two) Times a Day With Meals.     • nitroglycerin (NITROSTAT) 0.4 MG SL tablet 1 under the tongue as needed for angina, may repeat q5mins for up three doses 30 tablet 3   • PACERONE 400 MG tablet Take 1 tablet by mouth Daily. 90 tablet 3   • sacubitril-valsartan (ENTRESTO) 49-51 MG tablet Take 1 tablet by mouth 2 (Two) Times a Day. 60 tablet 5   • SPIRIVA RESPIMAT 2.5 MCG/ACT aerosol solution      • XARELTO 15 MG tablet TAKE ONE TABLET BY MOUTH DAILY 30 tablet 5   • spironolactone (ALDACTONE) 25 MG tablet Take 1 tablet by mouth Daily. 30 tablet 11   • [DISCONTINUED] CloNIDine (CATAPRES) 0.1 MG tablet Take 1 tablet by mouth 3 (Three) Times a Day As Needed for High Blood Pressure (SBP > 160 OR DBP > 90). 30 tablet 3   • [DISCONTINUED] furosemide (LASIX) 80 MG tablet 80 MG IN AM AND 40 MG IN PM       Facility-Administered Encounter Medications as of  8/15/2017   Medication Dose Route Frequency Provider Last Rate Last Dose   • CloNIDine (CATAPRES) tablet 0.1 mg  0.1 mg Oral Once VICKIE Cruz           Review of patient's allergies indicates no known allergies.    Past Medical History:   Diagnosis Date   • Acute myocardial infarction    • Atrial fibrillation    • CAD (coronary artery disease), native coronary artery    • Cardiac conduction disorder    • Cardiomyopathy     Dilated, Ischemic   • Congestive heart failure    • Dizziness    • Ejection fraction < 50%    • Gout    • Hematoma    • Hematuria    • Hemoptysis    • Hyperlipidemia    • Hypertension    • Hypothyroidism    • Postprocedural pain of extremity following cardiac catheterization    • Shortness of breath    • Stroke        Social History     Social History   • Marital status:      Spouse name: N/A   • Number of children: N/A   • Years of education: N/A     Occupational History   • Not on file.     Social History Main Topics   • Smoking status: Current Every Day Smoker     Packs/day: 0.25     Types: Cigarettes   • Smokeless tobacco: Never Used   • Alcohol use No   • Drug use: No   • Sexual activity: Not on file     Other Topics Concern   • Not on file     Social History Narrative       Family History   Problem Relation Age of Onset   • Cancer Mother    • Hypertension Mother    • Cancer Father    • Hypertension Brother        Review of Systems   Constitutional: Positive for fatigue.   HENT: Positive for trouble swallowing.    Eyes: Negative.    Respiratory: Positive for shortness of breath.    Cardiovascular: Positive for leg swelling. Negative for chest pain and palpitations.   Gastrointestinal: Negative.    Endocrine: Negative.    Genitourinary: Negative.    Musculoskeletal: Positive for arthralgias.   Skin: Negative.    Allergic/Immunologic: Negative.    Neurological: Negative.    Hematological: Bruises/bleeds easily.   Psychiatric/Behavioral: Negative.        Objective     /80  "(BP Location: Left arm, Patient Position: Sitting)  Pulse 70  Ht 70\" (177.8 cm)  Wt (!) 312 lb 12.8 oz (142 kg)  SpO2 97%  BMI 44.88 kg/m2    Lab Results (most recent)     None          Physical Exam   Constitutional: He is oriented to person, place, and time. He appears well-developed and well-nourished. No distress.   HENT:   Head: Normocephalic and atraumatic.   Eyes: EOM are normal. Pupils are equal, round, and reactive to light.   Neck: No JVD present.   Cardiovascular: Normal rate and normal heart sounds.  An irregularly irregular rhythm present. Exam reveals no gallop and no friction rub.    No murmur heard.  Pulmonary/Chest: Effort normal and breath sounds normal. No respiratory distress. He has no wheezes. He has no rales. He exhibits no tenderness.   Abdominal: Soft.   Musculoskeletal: Normal range of motion. He exhibits no edema.   Neurological: He is alert and oriented to person, place, and time. No cranial nerve deficit.   Skin: Skin is warm and dry. No rash noted. No erythema. No pallor.   Psychiatric: He has a normal mood and affect. His behavior is normal.   Nursing note and vitals reviewed.      Procedure   Procedures       Assessment/Plan     Problems Addressed this Visit        Cardiovascular and Mediastinum    Coronary artery disease involving native coronary artery of native heart without angina pectoris    Relevant Orders    Ambulatory Referral to Pulmonology    Ambulatory Referral to Occupational Therapy    Cardiomyopathy - Primary    Relevant Orders    Ambulatory Referral to Pulmonology    Ambulatory Referral to Occupational Therapy       Respiratory    Shortness of breath    Relevant Orders    Ambulatory Referral to Pulmonology    Ambulatory Referral to Occupational Therapy            Recommendations  1.  Patient will like to be set up with pulmonologist in outpatient after being seen in the hospital.  He is euvolemic by exam today we'll continue medical therapy.  2.  He also follow " through at the [facility].  He is to have a upgrade of his AICD.  3.  Patient will like to see occupational therapy.  Patient describes having difficulty swallowing and request to have evaluation through their services.  4.  We'll see patient back for follow-up in one to 2 months.  He will monitor weights closely.  Follow-up with primary as scheduled

## 2017-09-13 PROBLEM — I50.20 SYSTOLIC CONGESTIVE HEART FAILURE (HCC): Status: ACTIVE | Noted: 2017-01-01

## 2017-09-13 NOTE — PATIENT INSTRUCTIONS
Heart Failure  Heart failure is a condition in which the heart has trouble pumping blood. This means your heart does not pump blood efficiently for your body to work well. In some cases of heart failure, fluid may back up into your lungs or you may have swelling (edema) in your lower legs. Heart failure is usually a long-term (chronic) condition. It is important for you to take good care of yourself and follow your health care provider's treatment plan.  CAUSES   Some health conditions can cause heart failure. Those health conditions include:  · High blood pressure (hypertension). Hypertension causes the heart muscle to work harder than normal. When pressure in the blood vessels is high, the heart needs to pump (contract) with more force in order to circulate blood throughout the body. High blood pressure eventually causes the heart to become stiff and weak.  · Coronary artery disease (CAD). CAD is the buildup of cholesterol and fat (plaque) in the arteries of the heart. The blockage in the arteries deprives the heart muscle of oxygen and blood. This can cause chest pain and may lead to a heart attack. High blood pressure can also contribute to CAD.  · Heart attack (myocardial infarction). A heart attack occurs when one or more arteries in the heart become blocked. The loss of oxygen damages the muscle tissue of the heart. When this happens, part of the heart muscle dies. The injured tissue does not contract as well and weakens the heart's ability to pump blood.  · Abnormal heart valves. When the heart valves do not open and close properly, it can cause heart failure. This makes the heart muscle pump harder to keep the blood flowing.  · Heart muscle disease (cardiomyopathy or myocarditis). Heart muscle disease is damage to the heart muscle from a variety of causes. These can include drug or alcohol abuse, infections, or unknown reasons. These can increase the risk of heart failure.  · Lung disease. Lung disease  makes the heart work harder because the lungs do not work properly. This can cause a strain on the heart, leading it to fail.  · Diabetes. Diabetes increases the risk of heart failure. High blood sugar contributes to high fat (lipid) levels in the blood. Diabetes can also cause slow damage to tiny blood vessels that carry important nutrients to the heart muscle. When the heart does not get enough oxygen and food, it can cause the heart to become weak and stiff. This leads to a heart that does not contract efficiently.  · Other conditions can contribute to heart failure. These include abnormal heart rhythms, thyroid problems, and low blood counts (anemia).  Certain unhealthy behaviors can increase the risk of heart failure, including:  · Being overweight.  · Smoking or chewing tobacco.  · Eating foods high in fat and cholesterol.  · Abusing illicit drugs or alcohol.  · Lacking physical activity.  SYMPTOMS   Heart failure symptoms may vary and can be hard to detect. Symptoms may include:  · Shortness of breath with activity, such as climbing stairs.  · Persistent cough.  · Swelling of the feet, ankles, legs, or abdomen.  · Unexplained weight gain.  · Difficulty breathing when lying flat (orthopnea).  · Waking from sleep because of the need to sit up and get more air.  · Rapid heartbeat.  · Fatigue and loss of energy.  · Feeling light-headed, dizzy, or close to fainting.  · Loss of appetite.  · Nausea.  · Increased urination during the night (nocturia).  DIAGNOSIS   A diagnosis of heart failure is based on your history, symptoms, physical examination, and diagnostic tests. Diagnostic tests for heart failure may include:  · Echocardiography.  · Electrocardiography.  · Chest X-ray.  · Blood tests.  · Exercise stress test.  · Cardiac angiography.  · Radionuclide scans.  TREATMENT   Treatment is aimed at managing the symptoms of heart failure. Medicines, behavioral changes, or surgical intervention may be necessary to  treat heart failure.  · Medicines to help treat heart failure may include:    Angiotensin-converting enzyme (ACE) inhibitors. This type of medicine blocks the effects of a blood protein called angiotensin-converting enzyme. ACE inhibitors relax (dilate) the blood vessels and help lower blood pressure.    Angiotensin receptor blockers (ARBs). This type of medicine blocks the actions of a blood protein called angiotensin. Angiotensin receptor blockers dilate the blood vessels and help lower blood pressure.    Water pills (diuretics). Diuretics cause the kidneys to remove salt and water from the blood. The extra fluid is removed through urination. This loss of extra fluid lowers the volume of blood the heart pumps.    Beta blockers. These prevent the heart from beating too fast and improve heart muscle strength.    Digitalis. This increases the force of the heartbeat.  · Healthy behavior changes include:    Obtaining and maintaining a healthy weight.    Stopping smoking or chewing tobacco.    Eating heart-healthy foods.    Limiting or avoiding alcohol.    Stopping illicit drug use.    Physical activity as directed by your health care provider.  · Surgical treatment for heart failure may include:    A procedure to open blocked arteries, repair damaged heart valves, or remove damaged heart muscle tissue.    A pacemaker to improve heart muscle function and control certain abnormal heart rhythms.    An internal cardioverter defibrillator to treat certain serious abnormal heart rhythms.    A left ventricular assist device (LVAD) to assist the pumping ability of the heart.  HOME CARE INSTRUCTIONS   · Take medicines only as directed by your health care provider. Medicines are important in reducing the workload of your heart, slowing the progression of heart failure, and improving your symptoms.    Do not stop taking your medicine unless directed by your health care provider.    Do not skip any dose of medicine.    Refill your  prescriptions before you run out of medicine. Your medicines are needed every day.  · Engage in moderate physical activity if directed by your health care provider. Moderate physical activity can benefit some people. The elderly and people with severe heart failure should consult with a health care provider for physical activity recommendations.  · Eat heart-healthy foods. Food choices should be free of trans fat and low in saturated fat, cholesterol, and salt (sodium). Healthy choices include fresh or frozen fruits and vegetables, fish, lean meats, legumes, fat-free or low-fat dairy products, and whole grain or high fiber foods. Talk to a dietitian to learn more about heart-healthy foods.  · Limit sodium if directed by your health care provider. Sodium restriction may reduce symptoms of heart failure in some people. Talk to a dietitian to learn more about heart-healthy seasonings.  · Use healthy cooking methods. Healthy cooking methods include roasting, grilling, broiling, baking, poaching, steaming, or stir-frying. Talk to a dietitian to learn more about healthy cooking methods.  · Limit fluids if directed by your health care provider. Fluid restriction may reduce symptoms of heart failure in some people.  · Weigh yourself every day. Daily weights are important in the early recognition of excess fluid. You should weigh yourself every morning after you urinate and before you eat breakfast. Wear the same amount of clothing each time you weigh yourself. Record your daily weight. Provide your health care provider with your weight record.  · Monitor and record your blood pressure if directed by your health care provider.  · Check your pulse if directed by your health care provider.  · Lose weight if directed by your health care provider. Weight loss may reduce symptoms of heart failure in some people.  · Stop smoking or chewing tobacco. Nicotine makes your heart work harder by causing your blood vessels to constrict.  Do not use nicotine gum or patches before talking to your health care provider.  · Keep all follow-up visits as directed by your health care provider. This is important.  · Limit alcohol intake to no more than 1 drink per day for nonpregnant women and 2 drinks per day for men. One drink equals 12 ounces of beer, 5 ounces of wine, or 1½ ounces of hard liquor. Drinking more than that is harmful to your heart. Tell your health care provider if you drink alcohol several times a week. Talk with your health care provider about whether alcohol is safe for you. If your heart has already been damaged by alcohol or you have severe heart failure, drinking alcohol should be stopped completely.  · Stop illicit drug use.  · Stay up-to-date with immunizations. It is especially important to prevent respiratory infections through current pneumococcal and influenza immunizations.  · Manage other health conditions such as hypertension, diabetes, thyroid disease, or abnormal heart rhythms as directed by your health care provider.  · Learn to manage stress.  · Plan rest periods when fatigued.  · Learn strategies to manage high temperatures. If the weather is extremely hot:    Avoid vigorous physical activity.    Use air conditioning or fans or seek a cooler location.    Avoid caffeine and alcohol.    Wear loose-fitting, lightweight, and light-colored clothing.  · Learn strategies to manage cold temperatures. If the weather is extremely cold:    Avoid vigorous physical activity.    Layer clothes.    Wear mittens or gloves, a hat, and a scarf when going outside.    Avoid alcohol.  · Obtain ongoing education and support as needed.  · Participate in or seek rehabilitation as needed to maintain or improve independence and quality of life.  SEEK MEDICAL CARE IF:   · You have a rapid weight gain.  · You have increasing shortness of breath that is unusual for you.  · You are unable to participate in your usual physical activities.  · You tire  easily.  · You cough more than normal, especially with physical activity.  · You have any or more swelling in areas such as your hands, feet, ankles, or abdomen.  · You are unable to sleep because it is hard to breathe.  · You feel like your heart is beating fast (palpitations).  · You become dizzy or light-headed upon standing up.  SEEK IMMEDIATE MEDICAL CARE IF:   · You have difficulty breathing.  · There is a change in mental status such as decreased alertness or difficulty with concentration.  · You have a pain or discomfort in your chest.  · You have an episode of fainting (syncope).  MAKE SURE YOU:   · Understand these instructions.  · Will watch your condition.  · Will get help right away if you are not doing well or get worse.     This information is not intended to replace advice given to you by your health care provider. Make sure you discuss any questions you have with your health care provider.     Document Released: 12/18/2006 Document Revised: 05/03/2016 Document Reviewed: 01/17/2014  [employer] Patient Education ©2017 [employer]

## 2017-09-13 NOTE — PROGRESS NOTES
Problem list     Subjective   Florencio Powell is a 57 y.o. male     Chief Complaint   Patient presents with   • Follow-up     patient appears in office today for follow up    • Shortness of Breath     patient states he is still easily SOA ; worse on exertion ; seeing Dr. Gaffney for further testing    • Hypertension     patient states B/P has been WNL at home on medications        HPI    1. Coronary artery disease  1.1 Catheterization, January 2014 indicated chronic total occlusion of the RCA with diffuse disease in the LAD and circumflex, no target for percutaneous intervention.  1.2 Repeat cath, January 2015 with stenting of the LAD and subtotal occlusion of the right with collateral flows.  1.3 Stress Test 6/16/16 - study corroborates the patient's known CAD; EF 23%  1.4 stress test May 2017 demonstrated inferoposterolateral infarct with severely depressed systolic function  2. Dilated cardiomyopathy  2.1 Echocardiogram during hospitalization, October 2014 indicated an EF of approximately 20-25%.  2.2 EF of 35-40% by echocardiogram in April 2015.  2.3 MUGA scan, June 2015 with EF of 36%.  2.4 Echo 6/13/16 - EF 15-20%  2.5 Pacemaker upgrade to AICD (single chamber) placement 8/12/16 with Dr. Mckeon [email or web address]  3. Conduction system disease status post pacemaker implantation by Dr. Bravo, October 2014.   4. Chronic Atrial fibrillation on Xarelto  5. History of CVA secondary to carotid artery stenosis, followed by Dr. Rader.   5.1 Embolic stroke of the right dewayne  6. Congestive heart failure during recent hospitalization, October 2014.  7. Hypertension  8. Obstructive sleep apnea, noncompliant with CPAP therapy.  9. Smoking Habituation  10. COPD   11. PVD   11.1 SU 10/17/16 - occlusion of the anterior tibial   11.2 nonobstructive carotid artery stenosis by ultrasound May 2017    Patient is a 57-year-old male that presents back for follow-up.  He has been doing well.  He has been monitoring his weight.  He has been  compliant with diuretic use.  He feels much improved.  He is scheduled to see [facility] later in the month upgrade of AICD    He describes no chest pain or pressure.  He has moderate dyspnea at baseline (New York Heart Association class 3-4 symptoms).  He denies PND orthopnea.  He doesn't palpitate or dysrhythmic symptoms.  He said no evidence of bleeding on Xarelto and otherwise feels well      Outpatient Encounter Prescriptions as of 9/13/2017   Medication Sig Dispense Refill   • amitriptyline (ELAVIL) 100 MG tablet Take 100 mg by mouth Daily.     • aspirin 81 MG tablet Take 1 tablet by mouth daily.     • atorvastatin (LIPITOR) 80 MG tablet TAKE ONE TABLET BY MOUTH EVERY EVENING 30 tablet 2   • BREO ELLIPTA 100-25 MCG/INH aerosol powder  2 (Two) Times a Day.     • bumetanide (BUMEX) 1 MG tablet Take 2 tablets by mouth 2 (Two) Times a Day. 60 tablet 6   • carvedilol (COREG) 12.5 MG tablet Take 1 tablet by mouth 2 (Two) Times a Day. 60 tablet 11   • isosorbide mononitrate (IMDUR) 30 MG 24 hr tablet TAKE 1/2 TABLET DAILY. (Patient taking differently: Take 30 mg by mouth Daily. TAKE 1/2 TABLET DAILY. ) 15 tablet 5   • levothyroxine (SYNTHROID, LEVOTHROID) 75 MCG tablet Take 1 tablet by mouth daily.     • metFORMIN (GLUCOPHAGE) 500 MG tablet Take 500 mg by mouth 2 (Two) Times a Day With Meals.     • nitroglycerin (NITROSTAT) 0.4 MG SL tablet 1 under the tongue as needed for angina, may repeat q5mins for up three doses 30 tablet 3   • PACERONE 400 MG tablet Take 1 tablet by mouth Daily. 90 tablet 3   • sacubitril-valsartan (ENTRESTO) 49-51 MG tablet Take 1 tablet by mouth 2 (Two) Times a Day. 60 tablet 5   • SPIRIVA RESPIMAT 2.5 MCG/ACT aerosol solution      • XARELTO 15 MG tablet TAKE ONE TABLET BY MOUTH DAILY 30 tablet 5   • [DISCONTINUED] amiodarone (PACERONE) 200 MG tablet Daily.     • [DISCONTINUED] spironolactone (ALDACTONE) 25 MG tablet Take 1 tablet by mouth Daily. 30 tablet 11      Facility-Administered Encounter Medications as of 9/13/2017   Medication Dose Route Frequency Provider Last Rate Last Dose   • CloNIDine (CATAPRES) tablet 0.1 mg  0.1 mg Oral Once VICKIE Cruz           Review of patient's allergies indicates no known allergies.    Past Medical History:   Diagnosis Date   • Acute myocardial infarction    • Atrial fibrillation    • CAD (coronary artery disease), native coronary artery    • Cardiac conduction disorder    • Cardiomyopathy     Dilated, Ischemic   • Congestive heart failure    • Dizziness    • Ejection fraction < 50%    • Gout    • Hematoma    • Hematuria    • Hemoptysis    • Hyperlipidemia    • Hypertension    • Hypothyroidism    • Postprocedural pain of extremity following cardiac catheterization    • Shortness of breath    • Stroke        Social History     Social History   • Marital status:      Spouse name: N/A   • Number of children: N/A   • Years of education: N/A     Occupational History   • Not on file.     Social History Main Topics   • Smoking status: Former Smoker     Packs/day: 0.25     Types: Cigarettes   • Smokeless tobacco: Never Used   • Alcohol use No   • Drug use: No   • Sexual activity: Defer     Other Topics Concern   • Not on file     Social History Narrative       Family History   Problem Relation Age of Onset   • Cancer Mother    • Hypertension Mother    • Cancer Father    • Hypertension Brother        Review of Systems   Constitutional: Positive for fatigue (easily fatigued on exertion).   HENT: Negative.    Eyes: Positive for itching (due to allergies). Negative for visual disturbance.   Respiratory: Positive for shortness of breath (easily SOA, worse on exertoin). Negative for cough, chest tightness and wheezing.    Cardiovascular: Negative.  Negative for chest pain (denies CP), palpitations (denies palpitations/flutters) and leg swelling.   Gastrointestinal: Negative.  Negative for abdominal pain, nausea and vomiting.  "  Endocrine: Positive for heat intolerance (hard to breathe; nicole in high heat temp). Negative for cold intolerance, polyphagia and polyuria.   Genitourinary: Negative.  Negative for difficulty urinating, frequency and urgency.   Musculoskeletal: Positive for back pain (due to job history). Negative for arthralgias, neck pain and neck stiffness.   Skin: Negative.  Negative for rash and wound.   Allergic/Immunologic: Negative.  Negative for environmental allergies and food allergies.   Neurological: Negative.  Negative for dizziness, weakness, light-headedness and headaches.   Hematological: Negative.  Does not bruise/bleed easily.   Psychiatric/Behavioral: Positive for confusion (easily confused). Negative for agitation and sleep disturbance. The patient is not nervous/anxious.        Objective     /75 (BP Location: Left arm, Patient Position: Sitting)  Pulse 71  Ht 70\" (177.8 cm)  Wt (!) 315 lb (143 kg)  SpO2 97%  BMI 45.2 kg/m2    Lab Results (most recent)     None          Physical Exam   Constitutional: He is oriented to person, place, and time. He appears well-developed and well-nourished. No distress.   HENT:   Head: Normocephalic and atraumatic.   Eyes: EOM are normal. Pupils are equal, round, and reactive to light.   Neck: No JVD present.   Cardiovascular: Normal rate, regular rhythm and normal heart sounds.  Exam reveals no gallop and no friction rub.    No murmur heard.  Pulmonary/Chest: Effort normal and breath sounds normal. No respiratory distress. He has no wheezes. He has no rales. He exhibits no tenderness.   Abdominal: Soft.   Musculoskeletal: Normal range of motion. He exhibits edema.   Venous stasis   Neurological: He is alert and oriented to person, place, and time. No cranial nerve deficit.   Skin: Skin is warm and dry. No rash noted. No erythema. No pallor.   Psychiatric: He has a normal mood and affect. His behavior is normal.   Nursing note and vitals reviewed.      Procedure "   Procedures       Assessment/Plan     Problems Addressed this Visit        Cardiovascular and Mediastinum    Atrial fibrillation    Coronary artery disease involving native coronary artery of native heart without angina pectoris - Primary    Cardiomyopathy    Systolic congestive heart failure            Recommendation  1.  Patient with refractory failure with left bundle-branch block undergo upgrade of AICD at  later this month.  He is euvolemic by exam today.  He is on appropriate medications for cardiomyopathy.  2.  Chronic atrial fibrillation on Xarelto.  Right controlled with no evidence of bleeding or symptoms of cerebral embolic events  3.  Coronary artery disease with no symptoms of angina.  On appropriate medications including aspirin and high-dose statin.  4.  We'll see patient back for follow-up in 6 months or sooner symptoms discussed.  We have had a discussion concerning CHF in regards to monitoring weights etc.   5.  We'll see patient back for follow-up as scheduled.  Follow-up with primary as scheduled

## 2018-01-01 ENCOUNTER — OFFICE VISIT (OUTPATIENT)
Dept: CARDIOLOGY | Facility: CLINIC | Age: 58
End: 2018-01-01

## 2018-01-01 ENCOUNTER — TELEPHONE (OUTPATIENT)
Dept: CARDIOLOGY | Facility: CLINIC | Age: 58
End: 2018-01-01

## 2018-01-01 ENCOUNTER — HOSPITAL ENCOUNTER (OUTPATIENT)
Dept: CARDIOLOGY | Facility: HOSPITAL | Age: 58
Discharge: HOME | End: 2018-05-14

## 2018-01-01 ENCOUNTER — APPOINTMENT (OUTPATIENT)
Dept: CARDIOLOGY | Facility: HOSPITAL | Age: 58
End: 2018-01-01

## 2018-01-01 ENCOUNTER — HOSPITAL ENCOUNTER (OUTPATIENT)
Dept: CARDIOLOGY | Facility: HOSPITAL | Age: 58
Discharge: HOME | End: 2018-04-27
Admitting: PHYSICIAN ASSISTANT

## 2018-01-01 VITALS
BODY MASS INDEX: 44.75 KG/M2 | WEIGHT: 312.6 LBS | OXYGEN SATURATION: 99 % | DIASTOLIC BLOOD PRESSURE: 64 MMHG | SYSTOLIC BLOOD PRESSURE: 98 MMHG | HEIGHT: 70 IN | HEART RATE: 96 BPM

## 2018-01-01 VITALS
DIASTOLIC BLOOD PRESSURE: 89 MMHG | OXYGEN SATURATION: 98 % | BODY MASS INDEX: 45.1 KG/M2 | SYSTOLIC BLOOD PRESSURE: 130 MMHG | HEIGHT: 70 IN | WEIGHT: 315 LBS | HEART RATE: 93 BPM

## 2018-01-01 VITALS
SYSTOLIC BLOOD PRESSURE: 115 MMHG | BODY MASS INDEX: 44.64 KG/M2 | HEIGHT: 70 IN | OXYGEN SATURATION: 97 % | HEART RATE: 89 BPM | DIASTOLIC BLOOD PRESSURE: 75 MMHG | WEIGHT: 311.8 LBS

## 2018-01-01 DIAGNOSIS — I10 ESSENTIAL HYPERTENSION: ICD-10-CM

## 2018-01-01 DIAGNOSIS — I73.9 CLAUDICATION (HCC): ICD-10-CM

## 2018-01-01 DIAGNOSIS — I50.20 SYSTOLIC CONGESTIVE HEART FAILURE, UNSPECIFIED CONGESTIVE HEART FAILURE CHRONICITY: ICD-10-CM

## 2018-01-01 DIAGNOSIS — I25.10 CORONARY ARTERY DISEASE INVOLVING NATIVE CORONARY ARTERY OF NATIVE HEART WITHOUT ANGINA PECTORIS: ICD-10-CM

## 2018-01-01 DIAGNOSIS — I63.89 CEREBROVASCULAR ACCIDENT (CVA) DUE TO OTHER MECHANISM (HCC): Primary | ICD-10-CM

## 2018-01-01 DIAGNOSIS — R07.9 CHEST PAIN, UNSPECIFIED TYPE: ICD-10-CM

## 2018-01-01 DIAGNOSIS — R07.2 PRECORDIAL PAIN: ICD-10-CM

## 2018-01-01 DIAGNOSIS — F48.2 PSEUDOBULBAR AFFECT: Primary | ICD-10-CM

## 2018-01-01 DIAGNOSIS — I25.5 ISCHEMIC CARDIOMYOPATHY: ICD-10-CM

## 2018-01-01 DIAGNOSIS — R06.02 SHORTNESS OF BREATH: Primary | ICD-10-CM

## 2018-01-01 DIAGNOSIS — F48.2 PSEUDOBULBAR AFFECT: ICD-10-CM

## 2018-01-01 DIAGNOSIS — E78.5 HYPERLIPEMIA: ICD-10-CM

## 2018-01-01 DIAGNOSIS — I42.9 CARDIOMYOPATHY (HCC): ICD-10-CM

## 2018-01-01 DIAGNOSIS — I48.19 PERSISTENT ATRIAL FIBRILLATION (HCC): ICD-10-CM

## 2018-01-01 DIAGNOSIS — I48.0 PAROXYSMAL ATRIAL FIBRILLATION (HCC): ICD-10-CM

## 2018-01-01 DIAGNOSIS — I50.22 CHRONIC SYSTOLIC CONGESTIVE HEART FAILURE (HCC): ICD-10-CM

## 2018-01-01 DIAGNOSIS — I48.20 CHRONIC ATRIAL FIBRILLATION (HCC): ICD-10-CM

## 2018-01-01 PROCEDURE — 99213 OFFICE O/P EST LOW 20 MIN: CPT | Performed by: PHYSICIAN ASSISTANT

## 2018-01-01 PROCEDURE — 99214 OFFICE O/P EST MOD 30 MIN: CPT | Performed by: PHYSICIAN ASSISTANT

## 2018-01-01 PROCEDURE — 93925 LOWER EXTREMITY STUDY: CPT

## 2018-01-01 RX ORDER — SACUBITRIL AND VALSARTAN 49; 51 MG/1; MG/1
TABLET, FILM COATED ORAL
Qty: 60 TABLET | Refills: 3 | Status: SHIPPED | OUTPATIENT
Start: 2018-01-01 | End: 2018-01-01 | Stop reason: ALTCHOICE

## 2018-01-01 RX ORDER — ALBUTEROL SULFATE 90 UG/1
AEROSOL, METERED RESPIRATORY (INHALATION)
COMMUNITY

## 2018-01-01 RX ORDER — AMITRIPTYLINE HYDROCHLORIDE 100 MG/1
100 TABLET, FILM COATED ORAL DAILY
Qty: 14 TABLET | Refills: 0 | Status: SHIPPED | OUTPATIENT
Start: 2018-01-01 | End: 2018-01-01 | Stop reason: SDUPTHER

## 2018-01-01 RX ORDER — AMITRIPTYLINE HYDROCHLORIDE 100 MG/1
TABLET, FILM COATED ORAL
Qty: 30 TABLET | Refills: 2 | Status: SHIPPED | OUTPATIENT
Start: 2018-01-01

## 2018-01-01 RX ORDER — FEXOFENADINE HCL 180 MG/1
TABLET ORAL DAILY PRN
COMMUNITY

## 2018-01-01 RX ORDER — LORATADINE 10 MG/1
TABLET ORAL DAILY PRN
COMMUNITY

## 2018-01-01 RX ORDER — ALBUTEROL SULFATE 2.5 MG/3ML
SOLUTION RESPIRATORY (INHALATION)
COMMUNITY

## 2018-01-01 RX ORDER — INDOMETHACIN 25 MG/1
CAPSULE ORAL
COMMUNITY

## 2018-01-01 RX ORDER — AMIODARONE HYDROCHLORIDE 400 MG/1
TABLET ORAL
Qty: 30 TABLET | Refills: 3 | Status: SHIPPED | OUTPATIENT
Start: 2018-01-01

## 2018-01-01 RX ORDER — ATORVASTATIN CALCIUM 80 MG/1
TABLET, FILM COATED ORAL
Qty: 30 TABLET | Refills: 11 | Status: SHIPPED | OUTPATIENT
Start: 2018-01-01

## 2018-01-01 RX ORDER — ISOSORBIDE MONONITRATE 30 MG/1
TABLET, EXTENDED RELEASE ORAL
Qty: 15 TABLET | Refills: 3 | Status: SHIPPED | OUTPATIENT
Start: 2018-01-01

## 2018-01-01 RX ORDER — BUMETANIDE 1 MG/1
2 TABLET ORAL 2 TIMES DAILY
COMMUNITY

## 2018-01-01 RX ORDER — CARVEDILOL 25 MG/1
TABLET ORAL 2 TIMES DAILY
COMMUNITY

## 2018-01-01 RX ORDER — RIVAROXABAN 15 MG/1
TABLET, FILM COATED ORAL
Qty: 30 TABLET | Refills: 4 | Status: SHIPPED | OUTPATIENT
Start: 2018-01-01

## 2018-03-01 NOTE — TELEPHONE ENCOUNTER
Cardiac clearance req was received from Dr.Henry Jasbir Styles for dental extraction. This was reviewed by Ramy Crenshaw PA-C and will be faxed back. This patient will need Lovenox bridging due to H/O CVA. I will send in Lovenox to [facility]. -;[facility]

## 2018-03-21 NOTE — TELEPHONE ENCOUNTER
----- Message from Analisa Alfaro LPN sent at 3/15/2018  3:41 PM EDT -----  Contact: Paola- patient's daughter [phone]   Patient's tooth extraction was rescheduled due to patient being in the hospital. Please call patient's daughter re: Clearance.

## 2018-03-21 NOTE — TELEPHONE ENCOUNTER
PATIENT PRESENTED TO OFFICE TODAY , PER DAUGHTER HE PROCEEDED WITH DENTAL EXTRACTION ON 03/20/2018. -MAIRA;ZE

## 2018-03-21 NOTE — PATIENT INSTRUCTIONS
Obesity, Adult  Obesity is the condition of having too much total body fat. Being overweight or obese means that your weight is greater than what is considered healthy for your body size. Obesity is determined by a measurement called BMI. BMI is an estimate of body fat and is calculated from height and weight. For adults, a BMI of 30 or higher is considered obese.  Obesity can eventually lead to other health concerns and major illnesses, including:  · Stroke.  · Coronary artery disease (CAD).  · Type 2 diabetes.  · Some types of cancer, including cancers of the colon, breast, uterus, and gallbladder.  · Osteoarthritis.  · High blood pressure (hypertension).  · High cholesterol.  · Sleep apnea.  · Gallbladder stones.  · Infertility problems.  What are the causes?  The main cause of obesity is taking in (consuming) more calories than your body uses for energy. Other factors that contribute to this condition may include:  · Being born with genes that make you more likely to become obese.  · Having a medical condition that causes obesity. These conditions include:  ¨ Hypothyroidism.  ¨ Polycystic ovarian syndrome (PCOS).  ¨ Binge-eating disorder.  ¨ Cushing syndrome.  · Taking certain medicines, such as steroids, antidepressants, and seizure medicines.  · Not being physically active (sedentary lifestyle).  · Living where there are limited places to exercise safely or buy healthy foods.  · Not getting enough sleep.  What increases the risk?  The following factors may increase your risk of this condition:  · Having a family history of obesity.  · Being a woman of -American descent.  · Being a man of  descent.  What are the signs or symptoms?  Having excessive body fat is the main symptom of this condition.  How is this diagnosed?  This condition may be diagnosed based on:  · Your symptoms.  · Your medical history.  · A physical exam. Your health care provider may measure:  ¨ Your BMI. If you are an adult  with a BMI between 25 and less than 30, you are considered overweight. If you are an adult with a BMI of 30 or higher, you are considered obese.  ¨ The distances around your hips and your waist (circumferences). These may be compared to each other to help diagnose your condition.  ¨ Your skinfold thickness. Your health care provider may gently pinch a fold of your skin and measure it.  How is this treated?  Treatment for this condition often includes changing your lifestyle. Treatment may include some or all of the following:  · Dietary changes. Work with your health care provider and a dietitian to set a weight-loss goal that is healthy and reasonable for you. Dietary changes may include eating:  ¨ Smaller portions. A portion size is the amount of a particular food that is healthy for you to eat at one time. This varies from person to person.  ¨ Low-calorie or low-fat options.  ¨ More whole grains, fruits, and vegetables.  · Regular physical activity. This may include aerobic activity (cardio) and strength training.  · Medicine to help you lose weight. Your health care provider may prescribe medicine if you are unable to lose 1 pound a week after 6 weeks of eating more healthily and doing more physical activity.  · Surgery. Surgical options may include gastric banding and gastric bypass. Surgery may be done if:  ¨ Other treatments have not helped to improve your condition.  ¨ You have a BMI of 40 or higher.  ¨ You have life-threatening health problems related to obesity.  Follow these instructions at home:     Eating and drinking     · Follow recommendations from your health care provider about what you eat and drink. Your health care provider may advise you to:  ¨ Limit fast foods, sweets, and processed snack foods.  ¨ Choose low-fat options, such as low-fat milk instead of whole milk.  ¨ Eat 5 or more servings of fruits or vegetables every day.  ¨ Eat at home more often. This gives you more control over what you  eat.  ¨ Choose healthy foods when you eat out.  ¨ Learn what a healthy portion size is.  ¨ Keep low-fat snacks on hand.  ¨ Avoid sugary drinks, such as soda, fruit juice, iced tea sweetened with sugar, and flavored milk.  ¨ Eat a healthy breakfast.  · Drink enough water to keep your urine clear or pale yellow.  · Do not go without eating for long periods of time (do not fast) or follow a fad diet. Fasting and fad diets can be unhealthy and even dangerous.  Physical Activity   · Exercise regularly, as told by your health care provider. Ask your health care provider what types of exercise are safe for you and how often you should exercise.  · Warm up and stretch before being active.  · Cool down and stretch after being active.  · Rest between periods of activity.  Lifestyle   · Limit the time that you spend in front of your TV, computer, or video game system.  · Find ways to reward yourself that do not involve food.  · Limit alcohol intake to no more than 1 drink a day for nonpregnant women and 2 drinks a day for men. One drink equals 12 oz of beer, 5 oz of wine, or 1½ oz of hard liquor.  General instructions   · Keep a weight loss journal to keep track of the food you eat and how much you exercise you get.  · Take over-the-counter and prescription medicines only as told by your health care provider.  · Take vitamins and supplements only as told by your health care provider.  · Consider joining a support group. Your health care provider may be able to recommend a support group.  · Keep all follow-up visits as told by your health care provider. This is important.  Contact a health care provider if:  · You are unable to meet your weight loss goal after 6 weeks of dietary and lifestyle changes.  This information is not intended to replace advice given to you by your health care provider. Make sure you discuss any questions you have with your health care provider.  Document Released: 01/25/2006 Document Revised:  05/22/2017 Document Reviewed: 10/05/2016  [employer] Interactive Patient Education © 2017 [employer]  MyPlate from [employer]  The general, healthful diet is based on the 2010 Dietary Guidelines for Americans. The amount of food you need to eat from each food group depends on your age, sex, and level of physical activity and can be individualized by a dietitian. Go to ChooseMyPlate.gov for more information.  What do I need to know about the MyPlate plan?  · Enjoy your food, but eat less.  · Avoid oversized portions.  ¨ ½ of your plate should include fruits and vegetables.  ¨ ¼ of your plate should be grains.  ¨ ¼ of your plate should be protein.  Grains   · Make at least half of your grains whole grains.  · For a 2,000 calorie daily food plan, eat 6 oz every day.  · 1 oz is about 1 slice bread, 1 cup cereal, or ½ cup cooked rice, cereal, or pasta.  Vegetables   · Make half your plate fruits and vegetables.  · For a 2,000 calorie daily food plan, eat 2½ cups every day.  · 1 cup is about 1 cup raw or cooked vegetables or vegetable juice or 2 cups raw leafy greens.  Fruits   · Make half your plate fruits and vegetables.  · For a 2,000 calorie daily food plan, eat 2 cups every day.  · 1 cup is about 1 cup fruit or 100% fruit juice or ½ cup dried fruit.  Protein   · For a 2,000 calorie daily food plan, eat 5½ oz every day.  · 1 oz is about 1 oz meat, poultry, or fish, ¼ cup cooked beans, 1 egg, 1 Tbsp peanut butter, or ½ oz nuts or seeds.  Dairy   · Switch to fat-free or low-fat (1%) milk.  · For a 2,000 calorie daily food plan, eat 3 cups every day.  · 1 cup is about 1 cup milk or yogurt or soy milk (soy beverage), 1½ oz natural cheese, or 2 oz processed cheese.  Fats, Oils, and Empty Calories   · Only small amounts of oils are recommended.  · Empty calories are calories from solid fats or added sugars.  · Compare sodium in foods like soup, bread, and frozen meals. Choose the foods with lower numbers.  · Drink water instead  of sugary drinks.  What foods can I eat?  Grains   Whole grains such as whole wheat, quinoa, millet, and bulgur. Bread, rolls, and pasta made from whole grains. Brown or wild rice. Hot or cold cereals made from whole grains and without added sugar.  Vegetables   All fresh vegetables, especially fresh red, dark green, or orange vegetables. Peas and beans. Low-sodium frozen or canned vegetables prepared without added salt. Low-sodium vegetable juices.  Fruits   All fresh, frozen, and dried fruits. Canned fruit packed in water or fruit juice without added sugar. Fruit juices without added sugar.  Meats and Other Protein Sources   Boiled, baked, or grilled lean meat trimmed of fat. Skinless poultry. Fresh seafood and shellfish. Canned seafood packed in water. Unsalted nuts and unsalted nut butters. Tofu. Dried beans and pea. Eggs.  Dairy   Low-fat or fat-free milk, yogurt, and cheeses.  Sweets and Desserts   Frozen desserts made from low-fat milk.  Fats and Oils   Olive, peanut, and canola oils and margarine. Salad dressing and mayonnaise made from these oils.  Other   Soups and casseroles made from allowed ingredients and without added fat or salt.  The items listed above may not be a complete list of recommended foods or beverages. Contact your dietitian for more options.   What foods are not recommended?  Grains   Sweetened, low-fiber cereals. Packaged baked goods. Snack crackers and chips. Cheese crackers, butter crackers, and biscuits. Frozen waffles, sweet breads, doughnuts, pastries, packaged baking mixes, pancakes, cakes, and cookies.  Vegetables   Regular canned or frozen vegetables or vegetables prepared with salt. Canned tomatoes. Canned tomato sauce. Fried vegetables. Vegetables in cream sauce or cheese sauce.  Fruits   Fruits packed in syrup or made with added sugar.  Meats and Other Protein Sources   Marbled or fatty meats such as ribs. Poultry with skin. Fried meats, poultry, eggs, or fish. Sausages, hot  dogs, and deli meats such as pastrami, bologna, or salami.  Dairy   Whole milk, cream, cheeses made from whole milk, sour cream. Ice cream or yogurt made from whole milk or with added sugar.  Beverages   For adults, no more than one alcoholic drink per day. Regular soft drinks or other sugary beverages. Juice drinks.  Sweets and Desserts   Sugary or fatty desserts, candy, and other sweets.  Fats and Oils   Solid shortening or partially hydrogenated oils. Solid margarine. Margarine that contains trans fats. Butter.  The items listed above may not be a complete list of foods and beverages to avoid. Contact your dietitian for more information.   This information is not intended to replace advice given to you by your health care provider. Make sure you discuss any questions you have with your health care provider.  Document Released: 01/06/2009 Document Revised: 05/25/2017 Document Reviewed: 11/26/2014  [employer] Interactive Patient Education © 2017 [employer].

## 2018-03-21 NOTE — PROGRESS NOTES
Problem list     Subjective   Florencio Powell is a 57 y.o. male     Chief Complaint   Patient presents with   • Congestive Heart Failure     Here fro hosp. f/u   • Atrial Fibrillation   • Coronary Artery Disease   • Cardiomyopathy   • Hypertension   • Cerebrovascular Accident   • Hyperlipidemia       HPI    1. Coronary artery disease  1.1 Catheterization, January 2014 indicated chronic total occlusion of the RCA with diffuse disease in the LAD and circumflex, no target for percutaneous intervention.  1.2 Repeat cath, January 2015 with stenting of the LAD and subtotal occlusion of the right with collateral flows.  1.3 Stress Test 6/16/16 - study corroborates the patient's known CAD; EF 23%  1.4 stress test May 2017 demonstrated inferoposterolateral infarct with severely depressed systolic function  2. Dilated cardiomyopathy  2.1 Echocardiogram during hospitalization, October 2014 indicated an EF of approximately 20-25%.  2.2 EF of 35-40% by echocardiogram in April 2015.  2.3 MUGA scan, June 2015 with EF of 36%.  2.4 Echo 6/13/16 - EF 15-20%  2.5 Pacemaker upgrade to AICD (single chamber) placement 8/12/16 with Dr. Mckeon [employer]  3. Conduction system disease status post pacemaker implantation by Dr. Bravo, October 2014.   4. Chronic Atrial fibrillation on Xarelto  5. History of CVA secondary to carotid artery stenosis, followed by Dr. Rader.   5.1 Embolic stroke of the right dewayne  6. Congestive heart failure during recent hospitalization, October 2014.  7. Hypertension  8. Obstructive sleep apnea, noncompliant with CPAP therapy.  9. Smoking Habituation  10. COPD   11. PVD   11.1 SU 10/17/16 - occlusion of the anterior tibial   11.2 nonobstructive carotid artery stenosis by ultrasound May 2017    Patient is a 57-year-old male that presents to our office to follow-up after recent hospitalization admission.  Patient has had multiple hospitalizations recently.  The last hospital admission patient required  cardiopulmonary resuscitation because of hypoxia.  Patient had exacerbation of congestive heart failure and COPD and was significantly hypoxic.  He required intubation and was in the hospital for several days.  Patient describes to me that he stopped taking his diuretics as his kidney doctor was concerned about his chronic kidney disease.  Since discharge, patient has been taking diuretics patient feels improved.    Patient describes soreness from where he had cardiopulmonary resuscitation.  He describes no other chest pain.  His shortness of breath is moderate to severe baseline chronic.  He does not describe PND orthopnea.  He doesn't palpitate or have dysrhythmic symptoms.  He has severe lower extremity edema.  He has generalized weakness but otherwise is doing well      Outpatient Encounter Prescriptions as of 3/21/2018   Medication Sig Dispense Refill   • amitriptyline (ELAVIL) 100 MG tablet Take 100 mg by mouth Daily.     • aspirin 81 MG tablet Take 1 tablet by mouth daily.     • atorvastatin (LIPITOR) 80 MG tablet TAKE ONE TABLET BY MOUTH EVERY EVENING 30 tablet 11   • bumetanide (BUMEX) 1 MG tablet TAKE TWO TABLETS BY MOUTH TWICE A  tablet 5   • carvedilol (COREG) 12.5 MG tablet Take 1 tablet by mouth 2 (Two) Times a Day. 60 tablet 11   • ENTRESTO 49-51 MG tablet TAKE ONE TABLET BY MOUTH TWICE A DAY 60 tablet 4   • isosorbide mononitrate (IMDUR) 30 MG 24 hr tablet TAKE ONE-HALF TABLET BY MOUTH DAILY 15 tablet 4   • levothyroxine (SYNTHROID, LEVOTHROID) 75 MCG tablet Take 1 tablet by mouth daily.     • metFORMIN (GLUCOPHAGE) 500 MG tablet Take 500 mg by mouth 2 (Two) Times a Day With Meals.     • PACERONE 400 MG tablet TAKE ONE TABLET BY MOUTH DAILY 30 tablet 2   • SPIRIVA RESPIMAT 2.5 MCG/ACT aerosol solution      • XARELTO 15 MG tablet TAKE ONE TABLET BY MOUTH DAILY 30 tablet 4   • CloNIDine (CATAPRES) 0.1 MG tablet Take 1 po tid prn for b/p > 160/90 90 tablet 3   • nitroglycerin (NITROSTAT) 0.4 MG  SL tablet 1 under the tongue as needed for angina, may repeat q5mins for up three doses 30 tablet 3   • [DISCONTINUED] BREO ELLIPTA 100-25 MCG/INH aerosol powder  2 (Two) Times a Day.     • [DISCONTINUED] enoxaparin (LOVENOX) 150 MG/ML injection Inject 0.95 mL under the skin Every 12 (Twelve) Hours. 0.95 mL 0     Facility-Administered Encounter Medications as of 3/21/2018   Medication Dose Route Frequency Provider Last Rate Last Dose   • CloNIDine (CATAPRES) tablet 0.1 mg  0.1 mg Oral Once VICKIE Cruz           Review of patient's allergies indicates no known allergies.    Past Medical History:   Diagnosis Date   • Acute myocardial infarction    • Atrial fibrillation    • CAD (coronary artery disease), native coronary artery    • Cardiac conduction disorder    • Cardiomyopathy     Dilated, Ischemic   • Congestive heart failure    • Dizziness    • Ejection fraction < 50%    • Gout    • Hematoma    • Hematuria    • Hemoptysis    • Hyperlipidemia    • Hypertension    • Hypothyroidism    • Postprocedural pain of extremity following cardiac catheterization    • Shortness of breath    • Stroke        Social History     Social History   • Marital status:      Spouse name: N/A   • Number of children: N/A   • Years of education: N/A     Occupational History   • Not on file.     Social History Main Topics   • Smoking status: Former Smoker     Packs/day: 0.25     Types: Cigarettes   • Smokeless tobacco: Never Used   • Alcohol use No   • Drug use: No   • Sexual activity: Defer     Other Topics Concern   • Not on file     Social History Narrative   • No narrative on file       Family History   Problem Relation Age of Onset   • Cancer Mother    • Hypertension Mother    • Cancer Father    • Hypertension Brother        Review of Systems   Constitutional: Positive for fatigue.   HENT:        Recent dental extractions, bleeding issues   Eyes: Positive for visual disturbance (glasses prn).   Respiratory: Positive for  "shortness of breath.    Cardiovascular: Positive for chest pain and leg swelling. Negative for palpitations.   Gastrointestinal: Negative.    Endocrine: Negative.    Genitourinary: Negative.    Musculoskeletal: Negative.    Skin: Negative.    Allergic/Immunologic: Negative.    Neurological: Negative.    Hematological: Bruises/bleeds easily.   Psychiatric/Behavioral: Positive for sleep disturbance (off and on).       Objective   Vitals:    03/21/18 0940   BP: 98/64   BP Location: Left arm   Patient Position: Sitting   Pulse: 96   SpO2: 99%   Weight: (!) 142 kg (312 lb 9.6 oz)   Height: 177.8 cm (70\")      BP 98/64 (BP Location: Left arm, Patient Position: Sitting)   Pulse 96   Ht 177.8 cm (70\")   Wt (!) 142 kg (312 lb 9.6 oz)   SpO2 99%   BMI 44.85 kg/m²     Lab Results (most recent)     None          Physical Exam   Constitutional: He is oriented to person, place, and time. He appears well-developed and well-nourished. No distress.   HENT:   Head: Normocephalic and atraumatic.   Eyes: EOM are normal. Pupils are equal, round, and reactive to light.   Neck: No JVD present.   Cardiovascular: Normal rate, regular rhythm, normal heart sounds and intact distal pulses.  Exam reveals no gallop and no friction rub.    No murmur heard.  Pulmonary/Chest: Effort normal and breath sounds normal. No respiratory distress. He has no wheezes. He has no rales. He exhibits no tenderness.   Musculoskeletal: Normal range of motion. He exhibits edema.   Bilateral venous stasis in 3-4+ lower extremity edema   Neurological: He is alert and oriented to person, place, and time. No cranial nerve deficit.   Skin: Skin is warm and dry. No rash noted. No erythema. No pallor.   Psychiatric: He has a normal mood and affect. His behavior is normal.   Nursing note and vitals reviewed.      Procedure   Procedures       Assessment/Plan     Problems Addressed this Visit        Cardiovascular and Mediastinum    Coronary artery disease involving " native coronary artery of native heart without angina pectoris    Systolic congestive heart failure       Respiratory    Shortness of breath - Primary       Nervous and Auditory    Chest pain      Other Visit Diagnoses    None.          recommendation  1.  Patient has severe cardiomyopathy and end-stage heart failure.  Prior to his recent admission, he stopped taking diuretics and I instructed him that he will have to continue diuretic therapy because of severe LV dysfunction.  Since hospital discharge and since he has started taking this medications back, he feels improved.  He is slightly hypotensive.  I had an extensive discussion with the nurse present about him daily monitoring his weights in order to keep patient out of decompensated heart failure.  2.  Patient on appropriate medications at this time.  He is experienced 6 moderate to severe lower extremity edema but currently compliant with diuretics.  We recommend sodium restriction and as of now would not increase diuretics as he does not complain of PND or orthopnea and is feeling improved.  However I did discuss that if symptoms changed, he should contact our office for adjustment of diuretic therapy.    3.  I would like to see him back for follow-up in 2 weeks to reevaluate clinical course.  He recently had dental extraction and is feeling poorly today.  However, if symptoms change, he will contact our office.  Any chest pain not resolved by nitroglycerin, he should go to the ER.  We did discuss that in the future he may require cardiac catheterization but from recent hospital records it appears that patient's symptoms were related to congestive heart failure and COPD exacerbation which likely caused mildly elevated troponin rise.  Echocardiogram in the hospital demonstrated an EF of 20-25%.  This has been his stable baseline EF.  We will see him back for follow-up as scheduled.  He will follow-up with primary as scheduled                  Discussed the  patient's BMI with him. BMI is above normal parameters. Follow-up plan includes:  educational material.       Electronically signed by:

## 2018-03-21 NOTE — TELEPHONE ENCOUNTER
Attempted to call patients daughter @ 0516 PM. No answer -;[facility]    03/19/2018: called and left message to return call @ 0415pm. -;[facility]    03/20/2018 left message to return call @ 0530 PM. -;[facility]

## 2018-03-22 NOTE — TELEPHONE ENCOUNTER
Patient's sister came into the office after the patient was seen yesterday. She wanted clarification on what bumex dose the patient needs to be on. Patient was given 0.5mg 2 tablets twice a day by the hospital. Per Ramy Crenshaw PA-C; the patient is to stay on 1mg 2 tablets twice a day. Patient's sister verbalized understanding.

## 2018-04-09 PROBLEM — I73.9 CLAUDICATION (HCC): Status: ACTIVE | Noted: 2018-01-01

## 2018-04-09 NOTE — PROGRESS NOTES
Problem list     Subjective   Florencio Powell is a 57 y.o. male     Chief Complaint   Patient presents with   • Coronary Artery Disease     Here for 2 week f/u   • Atrial Fibrillation   • Congestive Heart Failure   • Cardiomyopathy   • Carotid Artery Disease   • Cerebrovascular Accident   • Hypothyroidism       HPI    1. Coronary artery disease  1.1 Catheterization, January 2014 indicated chronic total occlusion of the RCA with diffuse disease in the LAD and circumflex, no target for percutaneous intervention.  1.2 Repeat cath, January 2015 with stenting of the LAD and subtotal occlusion of the right with collateral flows.  1.3 Stress Test 6/16/16 - study corroborates the patient's known CAD; EF 23%  1.4 stress test May 2017 demonstrated inferoposterolateral infarct with severely depressed systolic function  2. Dilated cardiomyopathy  2.1 Echocardiogram during hospitalization, October 2014 indicated an EF of approximately 20-25%.  2.2 EF of 35-40% by echocardiogram in April 2015.  2.3 MUGA scan, June 2015 with EF of 36%.  2.4 Echo 6/13/16 - EF 15-20%  2.5 Pacemaker upgrade to AICD (single chamber) placement 8/12/16 with Dr. Mckeon Knewton  3. Conduction system disease status post pacemaker implantation by Dr. Bravo, October 2014.   4. Chronic Atrial fibrillation on Xarelto  5. History of CVA secondary to carotid artery stenosis, followed by Dr. Rader.   5.1 Embolic stroke of the right dewayne  6. Congestive heart failure during recent hospitalization, October 2014.  7. Hypertension  8. Obstructive sleep apnea, noncompliant with CPAP therapy.  9. Smoking Habituation  10. COPD   11. PVD   11.1 SU 10/17/16 - occlusion of the anterior tibial   11.2 nonobstructive carotid artery stenosis by ultrasound May 2017    Patient is a 57-year-old male that presents back for follow-up.  Patient is doing well since last visit here.  Patient does have stable angina but this is chronic without any progression.  He will  "occasionally get a pressure that resolves.  It has not worsened or progressed.  In fact, he feels his chest pain is from recent hospitalization admission and CPR.  Patient had exacerbation of COPD, as per prior note, and congestive heart failure.  He did not take his diuretics.  He presented to the ER in respiratory distress and required intubation and subsequently had cardiopulmonary arrest and required CPR.    Patient describes feelings \"sore\" since that time.  His chest discomfort is not as what he felt in the past with previous stenting.  Shortness of breath is moderate to severe at baseline.  No PND or orthopnea.  He doesn't palpitate or have dysrhythmic symptoms.  Mild lower extremity edema.  Otherwise is doing well       Outpatient Encounter Prescriptions as of 4/9/2018   Medication Sig Dispense Refill   • amitriptyline (ELAVIL) 100 MG tablet Take 100 mg by mouth Daily.     • aspirin 81 MG tablet Take 1 tablet by mouth daily.     • atorvastatin (LIPITOR) 80 MG tablet TAKE ONE TABLET BY MOUTH EVERY EVENING 30 tablet 11   • bumetanide (BUMEX) 1 MG tablet TAKE TWO TABLETS BY MOUTH TWICE A  tablet 5   • carvedilol (COREG) 12.5 MG tablet Take 1 tablet by mouth 2 (Two) Times a Day. 60 tablet 11   • ENTRESTO 49-51 MG tablet TAKE ONE TABLET BY MOUTH TWICE A DAY 60 tablet 4   • isosorbide mononitrate (IMDUR) 30 MG 24 hr tablet TAKE ONE-HALF TABLET BY MOUTH DAILY 15 tablet 3   • levothyroxine (SYNTHROID, LEVOTHROID) 75 MCG tablet Take 1 tablet by mouth daily.     • metFORMIN (GLUCOPHAGE) 500 MG tablet Take 500 mg by mouth 2 (Two) Times a Day With Meals.     • PACERONE 400 MG tablet TAKE ONE TABLET BY MOUTH DAILY 30 tablet 3   • XARELTO 15 MG tablet TAKE ONE TABLET BY MOUTH DAILY 30 tablet 4   • CloNIDine (CATAPRES) 0.1 MG tablet Take 1 po tid prn for b/p > 160/90 90 tablet 3   • nitroglycerin (NITROSTAT) 0.4 MG SL tablet 1 under the tongue as needed for angina, may repeat q5mins for up three doses 30 tablet 3 "   • [DISCONTINUED] SPIRIVA RESPIMAT 2.5 MCG/ACT aerosol solution        Facility-Administered Encounter Medications as of 4/9/2018   Medication Dose Route Frequency Provider Last Rate Last Dose   • CloNIDine (CATAPRES) tablet 0.1 mg  0.1 mg Oral Once VICKIE Cruz           Review of patient's allergies indicates no known allergies.    Past Medical History:   Diagnosis Date   • Acute myocardial infarction    • Atrial fibrillation    • CAD (coronary artery disease), native coronary artery    • Cardiac conduction disorder    • Cardiomyopathy     Dilated, Ischemic   • Congestive heart failure    • Dizziness    • Ejection fraction < 50%    • Gout    • Hematoma    • Hematuria    • Hemoptysis    • Hyperlipidemia    • Hypertension    • Hypothyroidism    • Postprocedural pain of extremity following cardiac catheterization    • Shortness of breath    • Stroke        Social History     Social History   • Marital status:      Spouse name: N/A   • Number of children: N/A   • Years of education: N/A     Occupational History   • Not on file.     Social History Main Topics   • Smoking status: Former Smoker     Packs/day: 0.25     Types: Cigarettes   • Smokeless tobacco: Never Used   • Alcohol use No   • Drug use: No   • Sexual activity: Defer     Other Topics Concern   • Not on file     Social History Narrative   • No narrative on file       Family History   Problem Relation Age of Onset   • Cancer Mother    • Hypertension Mother    • Cancer Father    • Hypertension Brother        Review of Systems   Constitutional: Positive for fatigue (off and on).   HENT: Negative.    Eyes: Positive for visual disturbance.   Respiratory: Positive for shortness of breath.    Cardiovascular: Positive for leg swelling. Negative for chest pain and palpitations.   Gastrointestinal: Negative.    Endocrine: Negative.    Genitourinary: Negative.    Musculoskeletal: Negative.    Skin: Negative.    Allergic/Immunologic: Negative.   "  Neurological: Negative.    Hematological: Bruises/bleeds easily (bleed).   Psychiatric/Behavioral: Negative.        Objective   Vitals:    04/09/18 1053   BP: 115/75   BP Location: Left arm   Patient Position: Sitting   Pulse: 89   SpO2: 97%   Weight: (!) 141 kg (311 lb 12.8 oz)   Height: 177.8 cm (70\")      /75 (BP Location: Left arm, Patient Position: Sitting)   Pulse 89   Ht 177.8 cm (70\")   Wt (!) 141 kg (311 lb 12.8 oz)   SpO2 97%   BMI 44.74 kg/m²     Lab Results (most recent)     None          Physical Exam   Constitutional: He is oriented to person, place, and time. He appears well-developed and well-nourished. No distress.   HENT:   Head: Normocephalic and atraumatic.   Eyes: EOM are normal. Pupils are equal, round, and reactive to light.   Neck: No JVD present.   Cardiovascular: Normal rate, regular rhythm and normal heart sounds.  Exam reveals no gallop and no friction rub.    No murmur heard.  Pulmonary/Chest: Effort normal and breath sounds normal. No respiratory distress. He has no wheezes. He has no rales. He exhibits no tenderness.   Abdominal: Soft. He exhibits no distension and no mass. There is no tenderness. There is no rebound and no guarding.   Musculoskeletal: Normal range of motion. He exhibits edema.   Venous stasis   Neurological: He is alert and oriented to person, place, and time. No cranial nerve deficit.   Skin: Skin is warm and dry. No rash noted. No erythema. No pallor.   Psychiatric: He has a normal mood and affect. His behavior is normal.   Nursing note and vitals reviewed.      Procedure   Procedures       Assessment/Plan     Problems Addressed this Visit        Cardiovascular and Mediastinum    Hypertension    Relevant Orders    CBC & Differential    Comprehensive Metabolic Panel    Coronary artery disease involving native coronary artery of native heart without angina pectoris    Relevant Orders    CBC & Differential    Comprehensive Metabolic Panel    Systolic " congestive heart failure    Relevant Orders    CBC & Differential    Comprehensive Metabolic Panel       Respiratory    Shortness of breath - Primary    Relevant Orders    CBC & Differential    Comprehensive Metabolic Panel       Nervous and Auditory    Claudication    Relevant Orders    Duplex Lower Extremity Art / Grafts - Bilateral CAR    CBC & Differential    Comprehensive Metabolic Panel      Other Visit Diagnoses    None.           Recommendation  1.  Patient is euvolemic by examination today.  Patient is on cardiomyopathy medications.  He is also followed by the [facility] and apparently spouse to have an implant procedure to monitor congestive heart failure..  2.  Patient with decreased pulses in the lower extremities would like to order arterial duplex to rule out obstructive disease.  3.  I would like to check routine blood work to monitor kidney function would also rule out anemia as patient appears quite pale today.  4.  Otherwise he is feeling improved.  We will continue to monitor volume status.  He will monitor weights daily.  He is compliant with diuretic regimen.  We will see him back for follow-up as scheduled.  Follow-up with primary as scheduled              Discussed the patient's BMI with him. BMI is above normal parameters. Follow-up plan includes:  educational material.       Electronically signed by:

## 2018-04-09 NOTE — PATIENT INSTRUCTIONS
Obesity, Adult  Obesity is the condition of having too much total body fat. Being overweight or obese means that your weight is greater than what is considered healthy for your body size. Obesity is determined by a measurement called BMI. BMI is an estimate of body fat and is calculated from height and weight. For adults, a BMI of 30 or higher is considered obese.  Obesity can eventually lead to other health concerns and major illnesses, including:  · Stroke.  · Coronary artery disease (CAD).  · Type 2 diabetes.  · Some types of cancer, including cancers of the colon, breast, uterus, and gallbladder.  · Osteoarthritis.  · High blood pressure (hypertension).  · High cholesterol.  · Sleep apnea.  · Gallbladder stones.  · Infertility problems.  What are the causes?  The main cause of obesity is taking in (consuming) more calories than your body uses for energy. Other factors that contribute to this condition may include:  · Being born with genes that make you more likely to become obese.  · Having a medical condition that causes obesity. These conditions include:  ¨ Hypothyroidism.  ¨ Polycystic ovarian syndrome (PCOS).  ¨ Binge-eating disorder.  ¨ Cushing syndrome.  · Taking certain medicines, such as steroids, antidepressants, and seizure medicines.  · Not being physically active (sedentary lifestyle).  · Living where there are limited places to exercise safely or buy healthy foods.  · Not getting enough sleep.  What increases the risk?  The following factors may increase your risk of this condition:  · Having a family history of obesity.  · Being a woman of -American descent.  · Being a man of  descent.  What are the signs or symptoms?  Having excessive body fat is the main symptom of this condition.  How is this diagnosed?  This condition may be diagnosed based on:  · Your symptoms.  · Your medical history.  · A physical exam. Your health care provider may measure:  ¨ Your BMI. If you are an adult  with a BMI between 25 and less than 30, you are considered overweight. If you are an adult with a BMI of 30 or higher, you are considered obese.  ¨ The distances around your hips and your waist (circumferences). These may be compared to each other to help diagnose your condition.  ¨ Your skinfold thickness. Your health care provider may gently pinch a fold of your skin and measure it.  How is this treated?  Treatment for this condition often includes changing your lifestyle. Treatment may include some or all of the following:  · Dietary changes. Work with your health care provider and a dietitian to set a weight-loss goal that is healthy and reasonable for you. Dietary changes may include eating:  ¨ Smaller portions. A portion size is the amount of a particular food that is healthy for you to eat at one time. This varies from person to person.  ¨ Low-calorie or low-fat options.  ¨ More whole grains, fruits, and vegetables.  · Regular physical activity. This may include aerobic activity (cardio) and strength training.  · Medicine to help you lose weight. Your health care provider may prescribe medicine if you are unable to lose 1 pound a week after 6 weeks of eating more healthily and doing more physical activity.  · Surgery. Surgical options may include gastric banding and gastric bypass. Surgery may be done if:  ¨ Other treatments have not helped to improve your condition.  ¨ You have a BMI of 40 or higher.  ¨ You have life-threatening health problems related to obesity.  Follow these instructions at home:     Eating and drinking     · Follow recommendations from your health care provider about what you eat and drink. Your health care provider may advise you to:  ¨ Limit fast foods, sweets, and processed snack foods.  ¨ Choose low-fat options, such as low-fat milk instead of whole milk.  ¨ Eat 5 or more servings of fruits or vegetables every day.  ¨ Eat at home more often. This gives you more control over what you  eat.  ¨ Choose healthy foods when you eat out.  ¨ Learn what a healthy portion size is.  ¨ Keep low-fat snacks on hand.  ¨ Avoid sugary drinks, such as soda, fruit juice, iced tea sweetened with sugar, and flavored milk.  ¨ Eat a healthy breakfast.  · Drink enough water to keep your urine clear or pale yellow.  · Do not go without eating for long periods of time (do not fast) or follow a fad diet. Fasting and fad diets can be unhealthy and even dangerous.  Physical Activity   · Exercise regularly, as told by your health care provider. Ask your health care provider what types of exercise are safe for you and how often you should exercise.  · Warm up and stretch before being active.  · Cool down and stretch after being active.  · Rest between periods of activity.  Lifestyle   · Limit the time that you spend in front of your TV, computer, or video game system.  · Find ways to reward yourself that do not involve food.  · Limit alcohol intake to no more than 1 drink a day for nonpregnant women and 2 drinks a day for men. One drink equals 12 oz of beer, 5 oz of wine, or 1½ oz of hard liquor.  General instructions   · Keep a weight loss journal to keep track of the food you eat and how much you exercise you get.  · Take over-the-counter and prescription medicines only as told by your health care provider.  · Take vitamins and supplements only as told by your health care provider.  · Consider joining a support group. Your health care provider may be able to recommend a support group.  · Keep all follow-up visits as told by your health care provider. This is important.  Contact a health care provider if:  · You are unable to meet your weight loss goal after 6 weeks of dietary and lifestyle changes.  This information is not intended to replace advice given to you by your health care provider. Make sure you discuss any questions you have with your health care provider.  Document Released: 01/25/2006 Document Revised:  05/22/2017 Document Reviewed: 10/05/2016  [employer] Interactive Patient Education © 2017 [employer]  MyPlate from [employer]  The general, healthful diet is based on the 2010 Dietary Guidelines for Americans. The amount of food you need to eat from each food group depends on your age, sex, and level of physical activity and can be individualized by a dietitian. Go to ChooseMyPlate.gov for more information.  What do I need to know about the MyPlate plan?  · Enjoy your food, but eat less.  · Avoid oversized portions.  ¨ ½ of your plate should include fruits and vegetables.  ¨ ¼ of your plate should be grains.  ¨ ¼ of your plate should be protein.  Grains   · Make at least half of your grains whole grains.  · For a 2,000 calorie daily food plan, eat 6 oz every day.  · 1 oz is about 1 slice bread, 1 cup cereal, or ½ cup cooked rice, cereal, or pasta.  Vegetables   · Make half your plate fruits and vegetables.  · For a 2,000 calorie daily food plan, eat 2½ cups every day.  · 1 cup is about 1 cup raw or cooked vegetables or vegetable juice or 2 cups raw leafy greens.  Fruits   · Make half your plate fruits and vegetables.  · For a 2,000 calorie daily food plan, eat 2 cups every day.  · 1 cup is about 1 cup fruit or 100% fruit juice or ½ cup dried fruit.  Protein   · For a 2,000 calorie daily food plan, eat 5½ oz every day.  · 1 oz is about 1 oz meat, poultry, or fish, ¼ cup cooked beans, 1 egg, 1 Tbsp peanut butter, or ½ oz nuts or seeds.  Dairy   · Switch to fat-free or low-fat (1%) milk.  · For a 2,000 calorie daily food plan, eat 3 cups every day.  · 1 cup is about 1 cup milk or yogurt or soy milk (soy beverage), 1½ oz natural cheese, or 2 oz processed cheese.  Fats, Oils, and Empty Calories   · Only small amounts of oils are recommended.  · Empty calories are calories from solid fats or added sugars.  · Compare sodium in foods like soup, bread, and frozen meals. Choose the foods with lower numbers.  · Drink water instead  of sugary drinks.  What foods can I eat?  Grains   Whole grains such as whole wheat, quinoa, millet, and bulgur. Bread, rolls, and pasta made from whole grains. Brown or wild rice. Hot or cold cereals made from whole grains and without added sugar.  Vegetables   All fresh vegetables, especially fresh red, dark green, or orange vegetables. Peas and beans. Low-sodium frozen or canned vegetables prepared without added salt. Low-sodium vegetable juices.  Fruits   All fresh, frozen, and dried fruits. Canned fruit packed in water or fruit juice without added sugar. Fruit juices without added sugar.  Meats and Other Protein Sources   Boiled, baked, or grilled lean meat trimmed of fat. Skinless poultry. Fresh seafood and shellfish. Canned seafood packed in water. Unsalted nuts and unsalted nut butters. Tofu. Dried beans and pea. Eggs.  Dairy   Low-fat or fat-free milk, yogurt, and cheeses.  Sweets and Desserts   Frozen desserts made from low-fat milk.  Fats and Oils   Olive, peanut, and canola oils and margarine. Salad dressing and mayonnaise made from these oils.  Other   Soups and casseroles made from allowed ingredients and without added fat or salt.  The items listed above may not be a complete list of recommended foods or beverages. Contact your dietitian for more options.   What foods are not recommended?  Grains   Sweetened, low-fiber cereals. Packaged baked goods. Snack crackers and chips. Cheese crackers, butter crackers, and biscuits. Frozen waffles, sweet breads, doughnuts, pastries, packaged baking mixes, pancakes, cakes, and cookies.  Vegetables   Regular canned or frozen vegetables or vegetables prepared with salt. Canned tomatoes. Canned tomato sauce. Fried vegetables. Vegetables in cream sauce or cheese sauce.  Fruits   Fruits packed in syrup or made with added sugar.  Meats and Other Protein Sources   Marbled or fatty meats such as ribs. Poultry with skin. Fried meats, poultry, eggs, or fish. Sausages, hot  dogs, and deli meats such as pastrami, bologna, or salami.  Dairy   Whole milk, cream, cheeses made from whole milk, sour cream. Ice cream or yogurt made from whole milk or with added sugar.  Beverages   For adults, no more than one alcoholic drink per day. Regular soft drinks or other sugary beverages. Juice drinks.  Sweets and Desserts   Sugary or fatty desserts, candy, and other sweets.  Fats and Oils   Solid shortening or partially hydrogenated oils. Solid margarine. Margarine that contains trans fats. Butter.  The items listed above may not be a complete list of foods and beverages to avoid. Contact your dietitian for more information.   This information is not intended to replace advice given to you by your health care provider. Make sure you discuss any questions you have with your health care provider.  Document Released: 01/06/2009 Document Revised: 05/25/2017 Document Reviewed: 11/26/2014  [employer] Interactive Patient Education © 2017 [employer].

## 2018-04-10 NOTE — TELEPHONE ENCOUNTER
B/P YEST. /66, H/R 84. STATES SEVERAL TIMES DURING THERAPY HIS B/P WILL BE 70'S/50'S, NON-SYMPTOMATIC PER VERNELL. NO DIZZINESS, PASSING OUT ETC. PER CULLEN CRAMER, PAC JUST MONITOR FOR NOW SINCE NOT SYMPTOMATIC WITH IT, BUT TO CALL THE OFFICE BACK FOR CONSISTENT  HYPOTENSION. VERNELL AWARE, VERBALIZED OK. ESTELLE CHRISTINE    ----- Message from Analisa Alfaro LPN sent at 4/10/2018  4:35 PM EDT -----  Contact: Vernell with [facility] EI- [phone]   Called to speak with a nurse re: patient's BP

## 2018-04-19 NOTE — TELEPHONE ENCOUNTER
----- Message from Aleshia MEEK Murali sent at 4/19/2018  8:43 AM EDT -----  Contact: MYRNA   SISTER  PATIENT'S SISTER CALLED AND IS WANTING CULLEN TO CALL HER ABOUT HER BROTHER. SHE IS  WANTING TO KNOW IF CULLEN CAN PLACE HIM ON A MEDICATION THAT'S PREVENT HIM FROM CRYING AND LAUGHING UNCONTROLLABLE WHEN HE GETS EMOTIONAL EVEN IN GENERAL CONSERVATION.  HIS AUNT HAS PASSED AWAY AND THIS WILL BE REAL EMOTIONAL FOR HIM.  PATIENT'S IS REQUESTING TO BE CALL [phone].      Spoke with Myrna, patient's sister who states that patient has been on a medication for quite some time to help with these symptoms. She states she is unsure of the name of this and would like me to call patient's daughter and ask he the name of the medication.   Spoke with Nerissa who states that the patient was diagnosed with PseudoBulbar Affect several years ago and has been on Amitriptyline 100 mg daily for this. She states that he sees a Dr. Rader for this, however due to patient's recent hospitalizations and ongoing health problems they had to reschedule his last appointment and he has run out. Dr. Rader's office will not authorize a refill until the patient is seen and that his appointment is not for several weeks. Per Cullen Crenshaw PA-C will authorize a 2 week refill for patient until he is able to see his Neurologist. Patient's daughter aware.

## 2018-06-04 NOTE — PATIENT INSTRUCTIONS
Obesity, Adult  Obesity is the condition of having too much total body fat. Being overweight or obese means that your weight is greater than what is considered healthy for your body size. Obesity is determined by a measurement called BMI. BMI is an estimate of body fat and is calculated from height and weight. For adults, a BMI of 30 or higher is considered obese.  Obesity can eventually lead to other health concerns and major illnesses, including:  · Stroke.  · Coronary artery disease (CAD).  · Type 2 diabetes.  · Some types of cancer, including cancers of the colon, breast, uterus, and gallbladder.  · Osteoarthritis.  · High blood pressure (hypertension).  · High cholesterol.  · Sleep apnea.  · Gallbladder stones.  · Infertility problems.  What are the causes?  The main cause of obesity is taking in (consuming) more calories than your body uses for energy. Other factors that contribute to this condition may include:  · Being born with genes that make you more likely to become obese.  · Having a medical condition that causes obesity. These conditions include:  ¨ Hypothyroidism.  ¨ Polycystic ovarian syndrome (PCOS).  ¨ Binge-eating disorder.  ¨ Cushing syndrome.  · Taking certain medicines, such as steroids, antidepressants, and seizure medicines.  · Not being physically active (sedentary lifestyle).  · Living where there are limited places to exercise safely or buy healthy foods.  · Not getting enough sleep.  What increases the risk?  The following factors may increase your risk of this condition:  · Having a family history of obesity.  · Being a woman of -American descent.  · Being a man of  descent.  What are the signs or symptoms?  Having excessive body fat is the main symptom of this condition.  How is this diagnosed?  This condition may be diagnosed based on:  · Your symptoms.  · Your medical history.  · A physical exam. Your health care provider may measure:  ¨ Your BMI. If you are an adult  with a BMI between 25 and less than 30, you are considered overweight. If you are an adult with a BMI of 30 or higher, you are considered obese.  ¨ The distances around your hips and your waist (circumferences). These may be compared to each other to help diagnose your condition.  ¨ Your skinfold thickness. Your health care provider may gently pinch a fold of your skin and measure it.  How is this treated?  Treatment for this condition often includes changing your lifestyle. Treatment may include some or all of the following:  · Dietary changes. Work with your health care provider and a dietitian to set a weight-loss goal that is healthy and reasonable for you. Dietary changes may include eating:  ¨ Smaller portions. A portion size is the amount of a particular food that is healthy for you to eat at one time. This varies from person to person.  ¨ Low-calorie or low-fat options.  ¨ More whole grains, fruits, and vegetables.  · Regular physical activity. This may include aerobic activity (cardio) and strength training.  · Medicine to help you lose weight. Your health care provider may prescribe medicine if you are unable to lose 1 pound a week after 6 weeks of eating more healthily and doing more physical activity.  · Surgery. Surgical options may include gastric banding and gastric bypass. Surgery may be done if:  ¨ Other treatments have not helped to improve your condition.  ¨ You have a BMI of 40 or higher.  ¨ You have life-threatening health problems related to obesity.  Follow these instructions at home:     Eating and drinking     · Follow recommendations from your health care provider about what you eat and drink. Your health care provider may advise you to:  ¨ Limit fast foods, sweets, and processed snack foods.  ¨ Choose low-fat options, such as low-fat milk instead of whole milk.  ¨ Eat 5 or more servings of fruits or vegetables every day.  ¨ Eat at home more often. This gives you more control over what you  eat.  ¨ Choose healthy foods when you eat out.  ¨ Learn what a healthy portion size is.  ¨ Keep low-fat snacks on hand.  ¨ Avoid sugary drinks, such as soda, fruit juice, iced tea sweetened with sugar, and flavored milk.  ¨ Eat a healthy breakfast.  · Drink enough water to keep your urine clear or pale yellow.  · Do not go without eating for long periods of time (do not fast) or follow a fad diet. Fasting and fad diets can be unhealthy and even dangerous.  Physical Activity   · Exercise regularly, as told by your health care provider. Ask your health care provider what types of exercise are safe for you and how often you should exercise.  · Warm up and stretch before being active.  · Cool down and stretch after being active.  · Rest between periods of activity.  Lifestyle   · Limit the time that you spend in front of your TV, computer, or video game system.  · Find ways to reward yourself that do not involve food.  · Limit alcohol intake to no more than 1 drink a day for nonpregnant women and 2 drinks a day for men. One drink equals 12 oz of beer, 5 oz of wine, or 1½ oz of hard liquor.  General instructions   · Keep a weight loss journal to keep track of the food you eat and how much you exercise you get.  · Take over-the-counter and prescription medicines only as told by your health care provider.  · Take vitamins and supplements only as told by your health care provider.  · Consider joining a support group. Your health care provider may be able to recommend a support group.  · Keep all follow-up visits as told by your health care provider. This is important.  Contact a health care provider if:  · You are unable to meet your weight loss goal after 6 weeks of dietary and lifestyle changes.  This information is not intended to replace advice given to you by your health care provider. Make sure you discuss any questions you have with your health care provider.  Document Released: 01/25/2006 Document Revised:  05/22/2017 Document Reviewed: 10/05/2016  [employer] Interactive Patient Education © 2017 [employer]  MyPlate from [employer]  The general, healthful diet is based on the 2010 Dietary Guidelines for Americans. The amount of food you need to eat from each food group depends on your age, sex, and level of physical activity and can be individualized by a dietitian. Go to ChooseMyPlate.gov for more information.  What do I need to know about the MyPlate plan?  · Enjoy your food, but eat less.  · Avoid oversized portions.  ¨ ½ of your plate should include fruits and vegetables.  ¨ ¼ of your plate should be grains.  ¨ ¼ of your plate should be protein.  Grains   · Make at least half of your grains whole grains.  · For a 2,000 calorie daily food plan, eat 6 oz every day.  · 1 oz is about 1 slice bread, 1 cup cereal, or ½ cup cooked rice, cereal, or pasta.  Vegetables   · Make half your plate fruits and vegetables.  · For a 2,000 calorie daily food plan, eat 2½ cups every day.  · 1 cup is about 1 cup raw or cooked vegetables or vegetable juice or 2 cups raw leafy greens.  Fruits   · Make half your plate fruits and vegetables.  · For a 2,000 calorie daily food plan, eat 2 cups every day.  · 1 cup is about 1 cup fruit or 100% fruit juice or ½ cup dried fruit.  Protein   · For a 2,000 calorie daily food plan, eat 5½ oz every day.  · 1 oz is about 1 oz meat, poultry, or fish, ¼ cup cooked beans, 1 egg, 1 Tbsp peanut butter, or ½ oz nuts or seeds.  Dairy   · Switch to fat-free or low-fat (1%) milk.  · For a 2,000 calorie daily food plan, eat 3 cups every day.  · 1 cup is about 1 cup milk or yogurt or soy milk (soy beverage), 1½ oz natural cheese, or 2 oz processed cheese.  Fats, Oils, and Empty Calories   · Only small amounts of oils are recommended.  · Empty calories are calories from solid fats or added sugars.  · Compare sodium in foods like soup, bread, and frozen meals. Choose the foods with lower numbers.  · Drink water instead  of sugary drinks.  What foods can I eat?  Grains   Whole grains such as whole wheat, quinoa, millet, and bulgur. Bread, rolls, and pasta made from whole grains. Brown or wild rice. Hot or cold cereals made from whole grains and without added sugar.  Vegetables   All fresh vegetables, especially fresh red, dark green, or orange vegetables. Peas and beans. Low-sodium frozen or canned vegetables prepared without added salt. Low-sodium vegetable juices.  Fruits   All fresh, frozen, and dried fruits. Canned fruit packed in water or fruit juice without added sugar. Fruit juices without added sugar.  Meats and Other Protein Sources   Boiled, baked, or grilled lean meat trimmed of fat. Skinless poultry. Fresh seafood and shellfish. Canned seafood packed in water. Unsalted nuts and unsalted nut butters. Tofu. Dried beans and pea. Eggs.  Dairy   Low-fat or fat-free milk, yogurt, and cheeses.  Sweets and Desserts   Frozen desserts made from low-fat milk.  Fats and Oils   Olive, peanut, and canola oils and margarine. Salad dressing and mayonnaise made from these oils.  Other   Soups and casseroles made from allowed ingredients and without added fat or salt.  The items listed above may not be a complete list of recommended foods or beverages. Contact your dietitian for more options.   What foods are not recommended?  Grains   Sweetened, low-fiber cereals. Packaged baked goods. Snack crackers and chips. Cheese crackers, butter crackers, and biscuits. Frozen waffles, sweet breads, doughnuts, pastries, packaged baking mixes, pancakes, cakes, and cookies.  Vegetables   Regular canned or frozen vegetables or vegetables prepared with salt. Canned tomatoes. Canned tomato sauce. Fried vegetables. Vegetables in cream sauce or cheese sauce.  Fruits   Fruits packed in syrup or made with added sugar.  Meats and Other Protein Sources   Marbled or fatty meats such as ribs. Poultry with skin. Fried meats, poultry, eggs, or fish. Sausages, hot  dogs, and deli meats such as pastrami, bologna, or salami.  Dairy   Whole milk, cream, cheeses made from whole milk, sour cream. Ice cream or yogurt made from whole milk or with added sugar.  Beverages   For adults, no more than one alcoholic drink per day. Regular soft drinks or other sugary beverages. Juice drinks.  Sweets and Desserts   Sugary or fatty desserts, candy, and other sweets.  Fats and Oils   Solid shortening or partially hydrogenated oils. Solid margarine. Margarine that contains trans fats. Butter.  The items listed above may not be a complete list of foods and beverages to avoid. Contact your dietitian for more information.   This information is not intended to replace advice given to you by your health care provider. Make sure you discuss any questions you have with your health care provider.  Document Released: 01/06/2009 Document Revised: 05/25/2017 Document Reviewed: 11/26/2014  [employer] Interactive Patient Education © 2017 [employer]vier Inc.

## 2018-06-04 NOTE — PROGRESS NOTES
Problem list     Subjective   Florencio Powell is a 57 y.o. male     Chief Complaint   Patient presents with   • Follow-up     presents for 2 month follow up    • Shortness of Breath   • Atrial Fibrillation       HPI    1. Coronary artery disease  1.1 Catheterization, January 2014 indicated chronic total occlusion of the RCA with diffuse disease in the LAD and circumflex, no target for percutaneous intervention.  1.2 Repeat cath, January 2015 with stenting of the LAD and subtotal occlusion of the right with collateral flows.  1.3 Stress Test 6/16/16 - study corroborates the patient's known CAD; EF 23%  1.4 stress test May 2017 demonstrated inferoposterolateral infarct with severely depressed systolic function  2. Dilated cardiomyopathy  2.1 Echocardiogram during hospitalization, October 2014 indicated an EF of approximately 20-25%.  2.2 EF of 35-40% by echocardiogram in April 2015.  2.3 MUGA scan, June 2015 with EF of 36%.  2.4 Echo 6/13/16 - EF 15-20%  2.5 Pacemaker upgrade to AICD (single chamber) placement 8/12/16 with Dr. Mckeon [employer]  2.6 recent upgrade to above the AICD [place] by Dr. Ramires  3. Conduction system disease status post pacemaker implantation by Dr. Bravo, October 2014.   4. Chronic Atrial fibrillation on Xarelto  5. History of CVA secondary to carotid artery stenosis, followed by Dr. Rader.   5.1 Embolic stroke of the right dewayne  6. Congestive heart failure during recent hospitalization, October 2014.  7. Hypertension  8. Obstructive sleep apnea, noncompliant with CPAP therapy.  9. Smoking Habituation  10. COPD   11. PVD   11.1 SU 10/17/16 - occlusion of the anterior tibial   11.2 nonobstructive carotid artery stenosis by ultrasound May 2017    Patient is a 57-year-old male that presents back for follow-up.  Patient is here to review lower arterial duplex.  Arterial duplex findings demonstrated nonobstructive disease bilaterally.    Patient recently saw EP services at [facility]  [place] and had upgrade of his defibrillator.  Patient describes that he is going tomorrow to have allergic cardioversion because of patient's atrial fibrillation.  Patient has chronic heart failure and this will hopefully help in regards to restore an atrial mechanical activity.    He does not express any chest pain.  Shortness of breath is moderate to severe baseline.  He has chronic heart failure but also COPD.  He does have PND and orthopnea.  Patient is compliant with diuretic regimen.  Patient's entresto and spironolactone was discontinued because of renal function by EP services at [facility].  Patient has no palpitations dizziness presyncope or syncope and otherwise is doing well              Outpatient Encounter Prescriptions as of 6/4/2018   Medication Sig Dispense Refill   • albuterol (PROVENTIL) (2.5 MG/3ML) 0.083% nebulizer solution prn     • albuterol (VENTOLIN HFA) 108 (90 Base) MCG/ACT inhaler prn     • amitriptyline (ELAVIL) 100 MG tablet TAKE ONE TABLET BY MOUTH DAILY 30 tablet 2   • aspirin 81 MG tablet Take 1 tablet by mouth daily.     • atorvastatin (LIPITOR) 80 MG tablet TAKE ONE TABLET BY MOUTH EVERY EVENING 30 tablet 11   • bumetanide (BUMEX) 1 MG tablet 2 mg 2 (Two) Times a Day.     • carvedilol (COREG) 25 MG tablet 2 (Two) Times a Day.     • CloNIDine (CATAPRES) 0.1 MG tablet Take 1 po tid prn for b/p > 160/90 90 tablet 3   • fexofenadine (ALLEGRA) 180 MG tablet Daily As Needed.     • indomethacin (INDOCIN) 25 MG capsule prn     • isosorbide mononitrate (IMDUR) 30 MG 24 hr tablet TAKE ONE-HALF TABLET BY MOUTH DAILY 15 tablet 3   • levothyroxine (SYNTHROID, LEVOTHROID) 75 MCG tablet Take 1 tablet by mouth daily.     • loratadine (CLARITIN) 10 MG tablet Daily As Needed.     • metFORMIN (GLUCOPHAGE) 500 MG tablet Take 500 mg by mouth 2 (Two) Times a Day With Meals.     • PACERONE 400 MG tablet TAKE ONE TABLET BY MOUTH DAILY 30 tablet 3   • sacubitril-valsartan (ENTRESTO) 49-51 MG  tablet 2 (Two) Times a Day.     • XARELTO 15 MG tablet TAKE ONE TABLET BY MOUTH DAILY 30 tablet 4   • nitroglycerin (NITROSTAT) 0.4 MG SL tablet 1 under the tongue as needed for angina, may repeat q5mins for up three doses 30 tablet 3   • [DISCONTINUED] bumetanide (BUMEX) 1 MG tablet TAKE TWO TABLETS BY MOUTH TWICE A  tablet 5   • [DISCONTINUED] carvedilol (COREG) 12.5 MG tablet Take 1 tablet by mouth 2 (Two) Times a Day. 60 tablet 11   • [DISCONTINUED] ENTRESTO 49-51 MG tablet TAKE ONE TABLET BY MOUTH TWICE A DAY 60 tablet 3   • [DISCONTINUED] rivaroxaban (XARELTO) 15 MG tablet Daily.       Facility-Administered Encounter Medications as of 6/4/2018   Medication Dose Route Frequency Provider Last Rate Last Dose   • CloNIDine (CATAPRES) tablet 0.1 mg  0.1 mg Oral Once VICKIE Cruz           Patient has no known allergies.    Past Medical History:   Diagnosis Date   • Acute myocardial infarction    • Atrial fibrillation    • CAD (coronary artery disease), native coronary artery    • Cardiac conduction disorder    • Cardiomyopathy     Dilated, Ischemic   • Congestive heart failure    • Dizziness    • Ejection fraction < 50%    • Gout    • Hematoma    • Hematuria    • Hemoptysis    • Hyperlipidemia    • Hypertension    • Hypothyroidism    • Postprocedural pain of extremity following cardiac catheterization    • Shortness of breath    • Stroke        Social History     Social History   • Marital status:      Spouse name: N/A   • Number of children: N/A   • Years of education: N/A     Occupational History   • Not on file.     Social History Main Topics   • Smoking status: Former Smoker     Packs/day: 0.25     Types: Cigarettes   • Smokeless tobacco: Never Used   • Alcohol use No   • Drug use: No   • Sexual activity: Defer     Other Topics Concern   • Not on file     Social History Narrative   • No narrative on file       Family History   Problem Relation Age of Onset   • Cancer Mother    •  "Hypertension Mother    • Cancer Father    • Hypertension Brother        Review of Systems   Constitutional: Positive for fatigue.   HENT: Negative.    Eyes: Negative.    Respiratory: Positive for shortness of breath.    Cardiovascular: Positive for leg swelling. Negative for chest pain and palpitations.   Gastrointestinal: Negative.    Endocrine: Negative.    Genitourinary: Negative.    Musculoskeletal: Positive for arthralgias and myalgias.   Skin: Negative.    Allergic/Immunologic: Positive for environmental allergies.   Neurological: Negative.    Hematological: Bruises/bleeds easily.   Psychiatric/Behavioral: Positive for sleep disturbance.   All other systems reviewed and are negative.      Objective   Vitals:    06/04/18 0851   BP: 130/89   BP Location: Left arm   Patient Position: Sitting   Pulse: 93   SpO2: 98%   Weight: (!) 148 kg (326 lb)   Height: 177.8 cm (70\")      /89 (BP Location: Left arm, Patient Position: Sitting)   Pulse 93   Ht 177.8 cm (70\")   Wt (!) 148 kg (326 lb) Comment: small scale  SpO2 98%   BMI 46.78 kg/m²     Lab Results (most recent)     None          Physical Exam   Constitutional: He is oriented to person, place, and time. He appears well-developed and well-nourished. No distress.   HENT:   Head: Normocephalic and atraumatic.   Eyes: EOM are normal. Pupils are equal, round, and reactive to light.   Neck: No JVD present.   Cardiovascular: Normal rate, regular rhythm, normal heart sounds and intact distal pulses.  Exam reveals no gallop and no friction rub.    No murmur heard.  Pulmonary/Chest: Effort normal and breath sounds normal. No respiratory distress. He has no wheezes. He has no rales. He exhibits no tenderness.   Musculoskeletal: Normal range of motion. He exhibits no edema.   Neurological: He is alert and oriented to person, place, and time. No cranial nerve deficit.   Skin: Skin is warm and dry. No rash noted. No erythema. No pallor.   Psychiatric: He has a normal " mood and affect. His behavior is normal.   Nursing note and vitals reviewed.      Procedure   Procedures       Assessment/Plan     Problems Addressed this Visit        Cardiovascular and Mediastinum    Atrial fibrillation    Relevant Medications    carvedilol (COREG) 25 MG tablet    sacubitril-valsartan (ENTRESTO) 49-51 MG tablet    Congestive heart failure    Relevant Medications    carvedilol (COREG) 25 MG tablet    sacubitril-valsartan (ENTRESTO) 49-51 MG tablet    Ischemic cardiomyopathy    Relevant Medications    carvedilol (COREG) 25 MG tablet    sacubitril-valsartan (ENTRESTO) 49-51 MG tablet       Respiratory    Shortness of breath - Primary            Recommendation  1.  Patient appears euvolemic by examination today.  He is compliant with Bumex regimen.  Entresto has been held.  He is going tomorrow for electrocardioversion.  For now, we will continue medical management.  He has no anginal symptoms.  He complains of no dysrhythmic symptoms.  He has no evidence of bleeding on anticoagulation no symptoms of cerebral embolic events.  He will follow-up with primary as scheduled              Patient's Body mass index is 46.78 kg/m². BMI is above normal parameters. Recommendations include: educational material.       Electronically signed by:     No